# Patient Record
Sex: FEMALE | Race: WHITE | NOT HISPANIC OR LATINO | Employment: FULL TIME | URBAN - METROPOLITAN AREA
[De-identification: names, ages, dates, MRNs, and addresses within clinical notes are randomized per-mention and may not be internally consistent; named-entity substitution may affect disease eponyms.]

---

## 2017-01-04 ENCOUNTER — OFFICE VISIT (OUTPATIENT)
Dept: FAMILY MEDICINE | Facility: CLINIC | Age: 37
End: 2017-01-04
Payer: COMMERCIAL

## 2017-01-04 VITALS
TEMPERATURE: 96.7 F | DIASTOLIC BLOOD PRESSURE: 83 MMHG | SYSTOLIC BLOOD PRESSURE: 131 MMHG | WEIGHT: 222 LBS | BODY MASS INDEX: 37.21 KG/M2 | OXYGEN SATURATION: 100 % | HEART RATE: 90 BPM

## 2017-01-04 DIAGNOSIS — J01.90 ACUTE SINUSITIS WITH SYMPTOMS > 10 DAYS: Primary | ICD-10-CM

## 2017-01-04 DIAGNOSIS — Z30.42 DEPO-PROVERA CONTRACEPTIVE STATUS: ICD-10-CM

## 2017-01-04 DIAGNOSIS — Z23 NEED FOR PROPHYLACTIC VACCINATION AND INOCULATION AGAINST INFLUENZA: ICD-10-CM

## 2017-01-04 PROCEDURE — 99213 OFFICE O/P EST LOW 20 MIN: CPT | Mod: 25 | Performed by: PHYSICIAN ASSISTANT

## 2017-01-04 PROCEDURE — 90686 IIV4 VACC NO PRSV 0.5 ML IM: CPT | Performed by: PHYSICIAN ASSISTANT

## 2017-01-04 PROCEDURE — 96372 THER/PROPH/DIAG INJ SC/IM: CPT | Mod: 59 | Performed by: PHYSICIAN ASSISTANT

## 2017-01-04 PROCEDURE — 90471 IMMUNIZATION ADMIN: CPT | Performed by: PHYSICIAN ASSISTANT

## 2017-01-04 RX ORDER — DOXYCYCLINE HYCLATE 100 MG
100 TABLET ORAL 2 TIMES DAILY
Qty: 20 TABLET | Refills: 0 | Status: SHIPPED | OUTPATIENT
Start: 2017-01-04 | End: 2017-07-14

## 2017-01-04 NOTE — NURSING NOTE
BLOOD PRESSURE: 131/83    DATE OF LAST PAP or ANNUAL EXAM: PAP      NIL   10/4/2012  URINE HCG:not indicated    The following medication was given:     MEDICATION: Depo Provera 150mg  ROUTE: IM  SITE: Arm - Right  : Prevacus  LOT #: [id number]  EXPIRATION:  NEXT INJECTION DUE: March 22 - April 5 (Reminder card given to patient)  Provider: Kristen Kehr, PA-C Katie Cummings MA

## 2017-01-04 NOTE — PROGRESS NOTES
Injectable Influenza Immunization Documentation    1.  Is the person to be vaccinated sick today? Yes    2. Does the person to be vaccinated have an allergy to eggs or to a component of the vaccine?  No    3. Has the person to be vaccinated today ever had a serious reaction to influenza vaccine in the past?  No    4. Has the person to be vaccinated ever had Guillain-Hartley syndrome?  No     Form completed by Tamela Nunez MA

## 2017-01-04 NOTE — MR AVS SNAPSHOT
After Visit Summary   1/4/2017    Francesca Moura    MRN: [id number]           Patient Information     Date Of Birth          1980        Visit Information        Provider Department      1/4/2017 2:20 PM Marisa Mccormick PA-C [facility]        Today's Diagnoses     Acute sinusitis with symptoms > 10 days    -  1       Care Instructions    Rest and increase fluids.    Sleep with head of bed elevated at night. Have a humidifier running at night    Alternate motrin and tylenol as needed for discomfort.    Perform nasal saline rinses to help decrease nasal congestion or breath in steam multiple times daily.     Continue your allergy medication and flonase as directed.    Follow up with primary care provider in 1-2 weeks if no improvement of symptoms. Sooner if any worsening of symptoms.             Follow-ups after your visit        Who to contact     If you have questions or need follow up information about today's clinic visit or your schedule please contact [facility] directly at [phone].  Normal or non-critical lab and imaging results will be communicated to you by SKURAhart, letter or phone within 4 business days after the clinic has received the results. If you do not hear from us within 7 days, please contact the clinic through Formula XOt or phone. If you have a critical or abnormal lab result, we will notify you by phone as soon as possible.  Submit refill requests through Oz Sonotek or call your pharmacy and they will forward the refill request to us. Please allow 3 business days for your refill to be completed.          Additional Information About Your Visit        MyChart Information     [employer] Sonotek gives you secure access to your electronic health record. If you see a primary care provider, you can also send messages to your care team and make appointments. If you have questions, please call your primary care clinic.  If you do not have a primary care provider,  please call [phone] and they will assist you.        Care EveryWhere ID     This is your Care EveryWhere ID. This could be used by other organizations to access your [place] medical records  [id number]        Your Vitals Were     Pulse Temperature Pulse Oximetry             90 96.7  F (35.9  C) (Oral) 100%          Blood Pressure from Last 3 Encounters:   01/04/17 131/83   10/12/16 129/84   09/16/16 135/86    Weight from Last 3 Encounters:   01/04/17 222 lb (100.699 kg)   10/12/16 220 lb 3.2 oz (99.882 kg)   09/16/16 217 lb (98.431 kg)              Today, you had the following     No orders found for display         Today's Medication Changes          These changes are accurate as of: 1/4/17  3:00 PM.  If you have any questions, ask your nurse or doctor.               Start taking these medicines.        Dose/Directions    doxycycline 100 MG tablet   Commonly known as:  VIBRA-TABS   Used for:  Acute sinusitis with symptoms > 10 days   Started by:  Marisa Mccormick PA-C        Dose:  100 mg   Take 1 tablet (100 mg) by mouth 2 times daily   Quantity:  20 tablet   Refills:  0            Where to get your medicines      These medications were sent to [facility]/pharmacy #[id number] - [place] - [address] [address]     Phone:  [phone]    - doxycycline 100 MG tablet             Primary Care Provider Office Phone # Fax #    Kristen M Kehr, PA-C [phone]       [facility] [address] [place] [address] [address]        Thank you!     Thank you for choosing [facility]  for your care. Our goal is always to provide you with excellent care. Hearing back from our patients is one way we can continue to improve our services. Please take a few minutes to complete the written survey that you may receive in the mail after your visit with us. Thank you!             Your Updated Medication List - Protect others around you: Learn how to safely use,  store and throw away your medicines at www.disposemymeds.org.          This list is accurate as of: 1/4/17  3:00 PM.  Always use your most recent med list.                   Brand Name Dispense Instructions for use    albuterol 108 (90 BASE) MCG/ACT Inhaler    PROAIR HFA/PROVENTIL HFA/VENTOLIN HFA    2 Inhaler    Inhale 2 puffs into the lungs every 4 hours as needed for shortness of breath / dyspnea       ALLEGRA ALLERGY PO          amitriptyline 25 MG tablet    ELAVIL         amphetamine-dextroamphetamine 5 MG per tablet    ADDERALL     Take 5 mg by mouth 2 times daily. Take 1 to 2 tablets every 4-5 hours, up to 3 doses per day       doxycycline 100 MG tablet    VIBRA-TABS    20 tablet    Take 1 tablet (100 mg) by mouth 2 times daily       medroxyPROGESTERone 150 MG/ML injection    DEPO-PROVERA    1 mL    Inject 1 mL (150 mg) into the muscle every 3 months       traMADol-acetaminophen 37.5-325 MG per tablet    ULTRACET     Take 1 tablet by mouth every 8 hours as needed for moderate pain

## 2017-01-04 NOTE — PROGRESS NOTES
SUBJECTIVE:                                                    Francesca Moura is a 36 year old female who presents to clinic today for the following health issues:      ENT Symptoms             Symptoms: cc Present Absent Comment   Fever/Chills   x    Fatigue   x    Muscle Aches   x    Eye Irritation   x    Sneezing   x    Nasal Jordon/Drg  x  Congestion is worsening   Sinus Pressure/Pain  x  Right side is worse, maxillary and frontal sinus pressure causing a headache now   Loss of smell  x     Dental pain   x    Sore Throat   x    Swollen Glands   x    Ear Pain/Fullness  x  Right ear pain, denies drainage and hearing loss   Cough   x    Wheeze   x    Chest Pain   x    Shortness of breath   x    Rash   x    Other   x      Symptom duration:  4 1/2 weeks.    Symptom severity:  Moderate   Treatments tried:  OTC medications/treatments.    Contacts:  Work             Problem list and histories reviewed & adjusted, as indicated.  Additional history: none    Patient Active Problem List   Diagnosis     Panic attacks     Depression, major     CARDIOVASCULAR SCREENING; LDL GOAL LESS THAN 160     Neck pain     Headache     Chest wall pain     Vitamin D deficiency     Sinusitis, chronic     Fatigue     Right tennis elbow     Gastroesophageal reflux disease without esophagitis     Tachycardia     Intermittent asthma, uncomplicated     Idiopathic hypersomnolence     Past Surgical History   Procedure Laterality Date     Surgical history of -   2009     c section     Abdomen surgery  09            Social History   Substance Use Topics     Smoking status: Former Smoker     Types: Cigarettes     Quit date: 2009     Smokeless tobacco: Never Used     Alcohol Use: No     Family History   Problem Relation Age of Onset     Hypertension Mother      Asthma Father      Allergies Father      Respiratory Maternal Grandmother      HEART DISEASE Maternal Grandfather      HEART DISEASE Paternal Grandmother      Hyperlipidemia  Father      CEREBROVASCULAR DISEASE Paternal Grandfather      CEREBROVASCULAR DISEASE Maternal Grandfather      Breast Cancer Other      Colon Cancer Paternal Grandmother      Other Cancer Other      Lung     Other Cancer Other      Lung     Other Cancer Maternal Grandfather      Skin     Depression Mother      Hyperlipidemia Mother      Substance Abuse Father      Alcohol         Current Outpatient Prescriptions   Medication Sig Dispense Refill     traMADol-acetaminophen (ULTRACET) 37.5-325 MG per tablet Take 1 tablet by mouth every 8 hours as needed for moderate pain       medroxyPROGESTERone (DEPO-PROVERA) 150 MG/ML injection Inject 1 mL (150 mg) into the muscle every 3 months 1 mL 4     albuterol (PROAIR HFA, PROVENTIL HFA, VENTOLIN HFA) 108 (90 BASE) MCG/ACT inhaler Inhale 2 puffs into the lungs every 4 hours as needed for shortness of breath / dyspnea 2 Inhaler 0     amitriptyline (ELAVIL) 25 MG tablet        Fexofenadine HCl (ALLEGRA ALLERGY PO)        amphetamine-dextroamphetamine (ADDERALL) 5 MG tablet Take 5 mg by mouth 2 times daily. Take 1 to 2 tablets every 4-5 hours, up to 3 doses per day       Allergies   Allergen Reactions     Bactrim Hives     Cephalexin Hives     Penicillins Hives     Vancomycin Hcl Hives     PERSIST EVEN DAY AFTER STOPPING MEDICATION.  leucopenia     BP Readings from Last 3 Encounters:   01/04/17 131/83   10/12/16 129/84   09/16/16 135/86    Wt Readings from Last 3 Encounters:   01/04/17 222 lb (100.699 kg)   10/12/16 220 lb 3.2 oz (99.882 kg)   09/16/16 217 lb (98.431 kg)                  Problem list, Medication list, Allergies, and Medical/Social/Surgical histories reviewed in [facility] and updated as appropriate.    ROS:  Constitutional, HEENT, cardiovascular, pulmonary, and integumentary systems are negative, except as otherwise noted.    OBJECTIVE:                                                    /83 mmHg  Pulse 90  Temp(Src) 96.7  F (35.9  C) (Oral)  Wt 222 lb  (100.699 kg)  SpO2 100%  Body mass index is 37.21 kg/(m^2).  GENERAL: healthy, alert and no distress  EYES: Eyes grossly normal to inspection  HENT: normal cephalic/atraumatic, ear canals and TM's normal, nose and mouth without ulcers or lesions, nasal mucosa edematous , rhinorrhea yellow and thick, oropharynx clear, oral mucous membranes moist and sinuses: maxillary, frontal tenderness on both sides  NECK: no adenopathy, no asymmetry, masses, or scars and thyroid normal to palpation  RESP: lungs clear to auscultation - no rales, rhonchi or wheezes  CV: regular rate and rhythm, normal S1 S2, no S3 or S4, no murmur, click or rub, no peripheral edema and peripheral pulses strong  MS: no gross musculoskeletal defects noted, no edema  SKIN: no suspicious lesions or rashes         ASSESSMENT/PLAN:                                                        ICD-10-CM    1. Acute sinusitis with symptoms > 10 days J01.90 doxycycline (VIBRA-TABS) 100 MG tablet   2. Need for prophylactic vaccination and inoculation against influenza Z23 FLU VAC, SPLIT VIRUS IM > 3 YO (QUADRIVALENT) [76449]     Vaccine Administration, Initial [00378]   3. Depo-Provera contraceptive status Z30.40 C Medroxyprogesterone inj/1mg       Patient Instructions   Rest and increase fluids.    Sleep with head of bed elevated at night. Have a humidifier running at night    Alternate motrin and tylenol as needed for discomfort.    Perform nasal saline rinses to help decrease nasal congestion or breath in steam multiple times daily.     Continue your allergy medication and flonase as directed.    Follow up with primary care provider in 1-2 weeks if no improvement of symptoms. Sooner if any worsening of symptoms.             Marisa Mccormick PA-C  [facility]

## 2017-01-04 NOTE — PATIENT INSTRUCTIONS
Rest and increase fluids.    Sleep with head of bed elevated at night. Have a humidifier running at night    Alternate motrin and tylenol as needed for discomfort.    Perform nasal saline rinses to help decrease nasal congestion or breath in steam multiple times daily.     Continue your allergy medication and flonase as directed.    Follow up with primary care provider in 1-2 weeks if no improvement of symptoms. Sooner if any worsening of symptoms.

## 2017-01-04 NOTE — NURSING NOTE
"Chief Complaint   Patient presents with     Sinus Problem       Mask not indicated for this appointment.     Initial /83 mmHg  Pulse 90  Temp(Src) 96.7  F (35.9  C) (Oral)  Wt 222 lb (100.699 kg)  SpO2 100% Estimated body mass index is 37.21 kg/(m^2) as calculated from the following:    Height as of 6/6/16: 5' 4.75\" (1.645 m).    Weight as of this encounter: 222 lb (100.699 kg)..  BP completed using cuff size: leilani Nunez MA    "

## 2017-01-24 DIAGNOSIS — J45.20 INTERMITTENT ASTHMA, UNCOMPLICATED: Primary | ICD-10-CM

## 2017-01-24 RX ORDER — ALBUTEROL SULFATE 90 UG/1
2 AEROSOL, METERED RESPIRATORY (INHALATION) EVERY 4 HOURS PRN
Qty: 2 INHALER | Refills: 0 | Status: SHIPPED | OUTPATIENT
Start: 2017-01-24 | End: 2017-03-22

## 2017-03-22 ENCOUNTER — OFFICE VISIT (OUTPATIENT)
Dept: FAMILY MEDICINE | Facility: CLINIC | Age: 37
End: 2017-03-22
Payer: COMMERCIAL

## 2017-03-22 VITALS
HEIGHT: 65 IN | TEMPERATURE: 98.6 F | DIASTOLIC BLOOD PRESSURE: 79 MMHG | SYSTOLIC BLOOD PRESSURE: 123 MMHG | HEART RATE: 87 BPM | BODY MASS INDEX: 37.65 KG/M2 | OXYGEN SATURATION: 99 % | WEIGHT: 226 LBS

## 2017-03-22 DIAGNOSIS — J45.20 INTERMITTENT ASTHMA, UNCOMPLICATED: ICD-10-CM

## 2017-03-22 DIAGNOSIS — M25.511 RIGHT SHOULDER PAIN, UNSPECIFIED CHRONICITY: Primary | ICD-10-CM

## 2017-03-22 PROCEDURE — 99213 OFFICE O/P EST LOW 20 MIN: CPT | Performed by: PHYSICIAN ASSISTANT

## 2017-03-22 RX ORDER — ALBUTEROL SULFATE 90 UG/1
2 AEROSOL, METERED RESPIRATORY (INHALATION) EVERY 4 HOURS PRN
Qty: 2 INHALER | Refills: 3 | Status: SHIPPED | OUTPATIENT
Start: 2017-03-22 | End: 2018-03-31

## 2017-03-22 NOTE — PATIENT INSTRUCTIONS
Contact Physical Therapy Scheduling at [phone]      Specialty Scheduling for Orthopedics: [phone]

## 2017-03-22 NOTE — MR AVS SNAPSHOT
After Visit Summary   3/22/2017    Francesca Moura    MRN: [id number]           Patient Information     Date Of Birth          1980        Visit Information        Provider Department      3/22/2017 12:50 PM Kehr, Kristen M, PA-C [facility]        Today's Diagnoses     Right shoulder pain, unspecified chronicity    -  1      Care Instructions    Contact Physical Therapy Scheduling at [phone]      Specialty Scheduling for Orthopedics: [phone]        Follow-ups after your visit        Additional Services     [facility] PT, HAND, AND CHIROPRACTIC REFERRAL       **This order will print in the [facility] Scheduling Office**    Physical Therapy, Hand Therapy and Chiropractic Care are available through:    *[place] [facility]  *[facility]  *[facility]    Call one number to schedule at any of the above locations: [phone].    Your provider has referred you to: Physical Therapy at [facility] or [facility]    Indication/Reason for Referral: right shoulder pain  Onset of Illness: 6-8 months  Therapy Orders: Evaluate and Treat  Special Programs: None  Special Request: None    Dasha Lou      Additional Comments for the Therapist or Chiropractor:       Please be aware that coverage of these services is subject to the terms and limitations of your health insurance plan.  Call member services at your health plan with any benefit or coverage questions.      Please bring the following to your appointment:    *Your personal calendar for scheduling future appointments  *Comfortable clothing            ORTHOPEDICS ADULT REFERRAL       Your provider has referred you to: [facility] Soniya Mariano [phone]   [email or web address]    Please be aware that coverage of these services is subject to the terms and limitations of your health insurance plan.  Call member services at your health plan with any benefit or  coverage questions.      Please bring the following to your appointment:    >>   Any x-rays, CTs or MRIs which have been performed.  Contact the facility where they were done to arrange for  prior to your scheduled appointment.    >>   List of current medications   >>   This referral request   >>   Any documents/labs given to you for this referral                  Who to contact     If you have questions or need follow up information about today's clinic visit or your schedule please contact [facility] directly at [phone].  Normal or non-critical lab and imaging results will be communicated to you by MyChart, letter or phone within 4 business days after the clinic has received the results. If you do not hear from us within 7 days, please contact the clinic through Univisionhart or phone. If you have a critical or abnormal lab result, we will notify you by phone as soon as possible.  Submit refill requests through Roadster or call your pharmacy and they will forward the refill request to us. Please allow 3 business days for your refill to be completed.          Additional Information About Your Visit        [employer] Information     Roadster gives you secure access to your electronic health record. If you see a primary care provider, you can also send messages to your care team and make appointments. If you have questions, please call your primary care clinic.  If you do not have a primary care provider, please call [phone] and they will assist you.        Care EveryWhere ID     This is your Care EveryWhere ID. This could be used by other organizations to access your [place] medical records  [id number]         Blood Pressure from Last 3 Encounters:   01/04/17 131/83   10/12/16 129/84   09/16/16 135/86    Weight from Last 3 Encounters:   01/04/17 222 lb (100.7 kg)   10/12/16 220 lb 3.2 oz (99.9 kg)   09/16/16 217 lb (98.4 kg)              We Performed the Following     [facility] PT, HAND, AND  CHIROPRACTIC REFERRAL     ORTHOPEDICS ADULT REFERRAL        Primary Care Provider Office Phone # Fax #    Kristen M Kehr, PA-C [phone]       [facility] [address] [address] [facility] [address]        Thank you!     Thank you for choosing [facility]  for your care. Our goal is always to provide you with excellent care. Hearing back from our patients is one way we can continue to improve our services. Please take a few minutes to complete the written survey that you may receive in the mail after your visit with us. Thank you!             Your Updated Medication List - Protect others around you: Learn how to safely use, store and throw away your medicines at www.disposemymeds.org.          This list is accurate as of: 3/22/17  1:10 PM.  Always use your most recent med list.                   Brand Name Dispense Instructions for use    albuterol 108 (90 BASE) MCG/ACT Inhaler    PROAIR HFA/PROVENTIL HFA/VENTOLIN HFA    2 Inhaler    Inhale 2 puffs into the lungs every 4 hours as needed for shortness of breath / dyspnea       ALLEGRA ALLERGY PO          amitriptyline 25 MG tablet    ELAVIL         amphetamine-dextroamphetamine 5 MG per tablet    ADDERALL     Take 5 mg by mouth 2 times daily. Take 1 to 2 tablets every 4-5 hours, up to 3 doses per day       doxycycline 100 MG tablet    VIBRA-TABS    20 tablet    Take 1 tablet (100 mg) by mouth 2 times daily       medroxyPROGESTERone 150 MG/ML injection    DEPO-PROVERA    1 mL    Inject 1 mL (150 mg) into the muscle every 3 months       traMADol-acetaminophen 37.5-325 MG per tablet    ULTRACET     Take 1 tablet by mouth every 8 hours as needed for moderate pain

## 2017-03-22 NOTE — PROGRESS NOTES
SUBJECTIVE:                                                    Francesca Moura is a 36 year old female who presents to clinic today for the following health issues:        Shoulder pain R side near arm pit. Dull throbbing sensation. Going on for the last 6-8 months. Pain has got worse over the last 2-3 weeks. Certain ROM makes it worse.   She works at [employer] and tried to lift a box of tile yesterday, that made the pain worse.   She denies an injury. She is already using diclofenac and ultram daily for fibromyalgia and they are not helping for her shoulder.         Problem list and histories reviewed & adjusted, as indicated.  Additional history: as documented    Patient Active Problem List   Diagnosis     Panic attacks     Depression, major     CARDIOVASCULAR SCREENING; LDL GOAL LESS THAN 160     Neck pain     Headache     Chest wall pain     Vitamin D deficiency     Sinusitis, chronic     Fatigue     Right tennis elbow     Gastroesophageal reflux disease without esophagitis     Tachycardia     Intermittent asthma, uncomplicated     Idiopathic hypersomnolence     Past Surgical History:   Procedure Laterality Date     ABDOMEN SURGERY  09         SURGICAL HISTORY OF -   2009    c section       Social History   Substance Use Topics     Smoking status: Former Smoker     Types: Cigarettes     Quit date: 3/8/2009     Smokeless tobacco: Never Used     Alcohol use No     Family History   Problem Relation Age of Onset     Hypertension Mother      Depression Mother      Hyperlipidemia Mother      Asthma Father      Allergies Father      Hyperlipidemia Father      Substance Abuse Father      Alcohol     Respiratory Maternal Grandmother      HEART DISEASE Maternal Grandfather      CEREBROVASCULAR DISEASE Maternal Grandfather      Other Cancer Maternal Grandfather      Skin     HEART DISEASE Paternal Grandmother      Colon Cancer Paternal Grandmother      CEREBROVASCULAR DISEASE Paternal Grandfather       "Breast Cancer Other      Other Cancer Other      Lung     Other Cancer Other      Lung         Current Outpatient Prescriptions   Medication Sig Dispense Refill     albuterol (PROAIR HFA/PROVENTIL HFA/VENTOLIN HFA) 108 (90 BASE) MCG/ACT Inhaler Inhale 2 puffs into the lungs every 4 hours as needed for shortness of breath / dyspnea 2 Inhaler 3     [DISCONTINUED] albuterol (PROAIR HFA/PROVENTIL HFA/VENTOLIN HFA) 108 (90 BASE) MCG/ACT Inhaler Inhale 2 puffs into the lungs every 4 hours as needed for shortness of breath / dyspnea 2 Inhaler 0     doxycycline (VIBRA-TABS) 100 MG tablet Take 1 tablet (100 mg) by mouth 2 times daily 20 tablet 0     traMADol-acetaminophen (ULTRACET) 37.5-325 MG per tablet Take 1 tablet by mouth every 8 hours as needed for moderate pain       medroxyPROGESTERone (DEPO-PROVERA) 150 MG/ML injection Inject 1 mL (150 mg) into the muscle every 3 months 1 mL 4     amitriptyline (ELAVIL) 25 MG tablet        Fexofenadine HCl (ALLEGRA ALLERGY PO)        amphetamine-dextroamphetamine (ADDERALL) 5 MG tablet Take 5 mg by mouth 2 times daily. Take 1 to 2 tablets every 4-5 hours, up to 3 doses per day       Allergies   Allergen Reactions     Bactrim Hives     Cephalexin Hives     Penicillins Hives     Vancomycin Hcl Hives     PERSIST EVEN DAY AFTER STOPPING MEDICATION.  leucopenia       Reviewed and updated as needed this visit by clinical staff       Reviewed and updated as needed this visit by Provider         ROS:  Constitutional, HEENT, cardiovascular, pulmonary, gi and gu systems are negative, except as otherwise noted.    OBJECTIVE:                                                    /79  Pulse 87  Temp 98.6  F (37  C) (Oral)  Ht 5' 5\" (1.651 m)  Wt 226 lb (102.5 kg)  SpO2 99%  BMI 37.61 kg/m2  Body mass index is 37.61 kg/(m^2).  GENERAL: healthy, alert and no distress  MS: R. Shoulder exam shows normal strength and muscle mass, no deformities, no evidence of joint instability. There is " tenderness over the biceps tendon. She has normal ROM of the shoulder joint.   SKIN: no suspicious lesions or rashes  NEURO: Normal strength and tone, mentation intact and speech normal  PSYCH: mentation appears normal, affect normal/bright    Diagnostic Test Results:  none      ASSESSMENT/PLAN:                                                      1. Right shoulder pain, unspecified chronicity  Start with physical therapy.   Defers MRI at this time due to cost.   Plan to see Orthopedics if pain worsens or persists.   - ORTHOPEDICS ADULT REFERRAL  - [facility] PT, HAND, AND CHIROPRACTIC REFERRAL    2. Intermittent asthma, uncomplicated  Stable refills given  - albuterol (PROAIR HFA/PROVENTIL HFA/VENTOLIN HFA) 108 (90 BASE) MCG/ACT Inhaler; Inhale 2 puffs into the lungs every 4 hours as needed for shortness of breath / dyspnea  Dispense: 2 Inhaler; Refill: 3      Kristen M. Kehr, PA-C  [facility]

## 2017-03-22 NOTE — NURSING NOTE
"Chief Complaint   Patient presents with     Shoulder Pain       Initial /79  Pulse 87  Temp 98.6  F (37  C) (Oral)  Ht 5' 5\" (1.651 m)  Wt 226 lb (102.5 kg)  SpO2 99%  BMI 37.61 kg/m2 Estimated body mass index is 37.61 kg/(m^2) as calculated from the following:    Height as of this encounter: 5' 5\" (1.651 m).    Weight as of this encounter: 226 lb (102.5 kg).  Medication Reconciliation: complete    Christine Amaya MA    "

## 2017-03-23 ASSESSMENT — ASTHMA QUESTIONNAIRES: ACT_TOTALSCORE: 23

## 2017-04-05 ENCOUNTER — ALLIED HEALTH/NURSE VISIT (OUTPATIENT)
Dept: NURSING | Facility: CLINIC | Age: 37
End: 2017-04-05
Payer: COMMERCIAL

## 2017-04-05 VITALS
BODY MASS INDEX: 38.11 KG/M2 | WEIGHT: 229 LBS | SYSTOLIC BLOOD PRESSURE: 133 MMHG | DIASTOLIC BLOOD PRESSURE: 87 MMHG | HEART RATE: 87 BPM

## 2017-04-05 DIAGNOSIS — Z30.42 DEPO-PROVERA CONTRACEPTIVE STATUS: Primary | ICD-10-CM

## 2017-04-05 PROCEDURE — 96372 THER/PROPH/DIAG INJ SC/IM: CPT

## 2017-04-05 PROCEDURE — 99207 ZZC NO CHARGE NURSE ONLY: CPT

## 2017-04-05 NOTE — NURSING NOTE
BLOOD PRESSURE: 133/87    The following medication was given:     MEDICATION: Depo Provera 150mg  ROUTE: IM  SITE: Deltoid - Left  : [employer]  LOT #: [id number]  EXPIRATION:8/2019  NEXT INJECTION DUE: June 20-July 4 2017  Provider: KRISTEN KEHR Elizabeth French, MA

## 2017-04-05 NOTE — MR AVS SNAPSHOT
After Visit Summary   4/5/2017    Francesca Moura    MRN: [id number]           Patient Information     Date Of Birth          1980        Visit Information        Provider Department      4/5/2017 1:45 PM AN ANCILLARY [facility]        Today's Diagnoses     Depo-Provera contraceptive status    -  1       Follow-ups after your visit        Who to contact     If you have questions or need follow up information about today's clinic visit or your schedule please contact [facility] directly at [phone].  Normal or non-critical lab and imaging results will be communicated to you by The Ath[employer] Empire[employer], letter or phone within 4 business days after the clinic has received the results. If you do not hear from us within 7 days, please contact the clinic through Code Fevert or phone. If you have a critical or abnormal lab result, we will notify you by phone as soon as possible.  Submit refill requests through [employer] or call your pharmacy and they will forward the refill request to us. Please allow 3 business days for your refill to be completed.          Additional Information About Your Visit        MyChart Information     [employer] gives you secure access to your electronic health record. If you see a primary care provider, you can also send messages to your care team and make appointments. If you have questions, please call your primary care clinic.  If you do not have a primary care provider, please call [phone] and they will assist you.        Care EveryWhere ID     This is your Care EveryWhere ID. This could be used by other organizations to access your [place] medical records  [id number]        Your Vitals Were     Pulse BMI (Body Mass Index)                87 38.11 kg/m2           Blood Pressure from Last 3 Encounters:   04/05/17 133/87   03/22/17 123/79   01/04/17 131/83    Weight from Last 3 Encounters:   04/05/17 229 lb (103.9 kg)   03/22/17 226 lb (102.5 kg)   01/04/17 222 lb  (100.7 kg)              We Performed the Following     C Medroxyprogesterone inj/1mg        Primary Care Provider Office Phone # Fax #    Kristen M Kehr, PA-C [phone]       [facility] [address] [facility] [address]        Thank you!     Thank you for choosing [facility]  for your care. Our goal is always to provide you with excellent care. Hearing back from our patients is one way we can continue to improve our services. Please take a few minutes to complete the written survey that you may receive in the mail after your visit with us. Thank you!             Your Updated Medication List - Protect others around you: Learn how to safely use, store and throw away your medicines at www.disposemymeds.org.          This list is accurate as of: 4/5/17  1:58 PM.  Always use your most recent med list.                   Brand Name Dispense Instructions for use    albuterol 108 (90 BASE) MCG/ACT Inhaler    PROAIR HFA/PROVENTIL HFA/VENTOLIN HFA    2 Inhaler    Inhale 2 puffs into the lungs every 4 hours as needed for shortness of breath / dyspnea       ALLEGRA ALLERGY PO          amitriptyline 25 MG tablet    ELAVIL         amphetamine-dextroamphetamine 5 MG per tablet    ADDERALL     Take 5 mg by mouth 2 times daily. Take 1 to 2 tablets every 4-5 hours, up to 3 doses per day       doxycycline 100 MG tablet    VIBRA-TABS    20 tablet    Take 1 tablet (100 mg) by mouth 2 times daily       medroxyPROGESTERone 150 MG/ML injection    DEPO-PROVERA    1 mL    Inject 1 mL (150 mg) into the muscle every 3 months       traMADol-acetaminophen 37.5-325 MG per tablet    ULTRACET     Take 1 tablet by mouth every 8 hours as needed for moderate pain

## 2017-04-11 ENCOUNTER — TRANSFERRED RECORDS (OUTPATIENT)
Dept: HEALTH INFORMATION MANAGEMENT | Facility: CLINIC | Age: 37
End: 2017-04-11

## 2017-07-03 ENCOUNTER — ALLIED HEALTH/NURSE VISIT (OUTPATIENT)
Dept: NURSING | Facility: CLINIC | Age: 37
End: 2017-07-03
Payer: COMMERCIAL

## 2017-07-03 DIAGNOSIS — Z30.42 SURVEILLANCE FOR DEPO-PROVERA CONTRACEPTION: Primary | ICD-10-CM

## 2017-07-03 PROCEDURE — 99207 ZZC NO CHARGE NURSE ONLY: CPT

## 2017-07-03 PROCEDURE — 96372 THER/PROPH/DIAG INJ SC/IM: CPT

## 2017-07-03 NOTE — PROGRESS NOTES
BLOOD PRESSURE: Data Unavailable    DATE OF LAST PAP or ANNUAL EXAM: Lab Results       Component                Value               Date                       PAP                      NIL                 10/04/2012            URINE HCG:not indicated    The following medication was given:     MEDICATION: Depo Provera 150mg  ROUTE: IM  SITE: Deltoid - Right  : [employer]  LOT #: [id number]  EXPIRATION:12/2019  NEXT INJECTION DUE: September 18-October 2,2017  NDC [address]-8  Provider: Kristen Kehr CColburn, CMA

## 2017-07-03 NOTE — MR AVS SNAPSHOT
After Visit Summary   7/3/2017    Francesca Moura    MRN: [id number]           Patient Information     Date Of Birth          1980        Visit Information        Provider Department      7/3/2017 12:15 PM BE ANCILLARY [facility]        Today's Diagnoses     Surveillance for Depo-Provera contraception    -  1       Follow-ups after your visit        Who to contact     If you have questions or need follow up information about today's clinic visit or your schedule please contact [facility] directly at [phone].  Normal or non-critical lab and imaging results will be communicated to you by MyChart, letter or phone within 4 business days after the clinic has received the results. If you do not hear from us within 7 days, please contact the clinic through Happy [employer] or phone. If you have a critical or abnormal lab result, we will notify you by phone as soon as possible.  Submit refill requests through [employer] or call your pharmacy and they will forward the refill request to us. Please allow 3 business days for your refill to be completed.          Additional Information About Your Visit        MyChart Information     [employer] gives you secure access to your electronic health record. If you see a primary care provider, you can also send messages to your care team and make appointments. If you have questions, please call your primary care clinic.  If you do not have a primary care provider, please call [phone] and they will assist you.        Care EveryWhere ID     This is your Care EveryWhere ID. This could be used by other organizations to access your [place] medical records  [id number]         Blood Pressure from Last 3 Encounters:   04/05/17 133/87   03/22/17 123/79   01/04/17 131/83    Weight from Last 3 Encounters:   04/05/17 229 lb (103.9 kg)   03/22/17 226 lb (102.5 kg)   01/04/17 222 lb (100.7 kg)              We Performed the Following     INJECTION INTRAMUSCULAR  OR SUB-Q     Medroxyprogesterone inj/1mg (Depo Provera J-Code)        Primary Care Provider Office Phone # Fax #    Kristen M Kehr, PA-C [phone]       [facility] [address] [facility] [address]        Equal Access to Services     TOMASZ RAMOS : Hadii aad ku hadasho Soomaali, waaxda luqadaha, qaybta kaalmada adeegyada, waxay idiin hayaan adeeg kharash la'aan . So [facility] [place] [facility] [phone].    ATENCIÓN: Si habla español, tiene a garcia disposición servicios gratuitos de asistencia lingüística. Angeloame al [phone].    We comply with applicable federal civil rights laws and [place] laws. We do not discriminate on the basis of race, color, national origin, age, disability sex, sexual orientation or gender identity.            Thank you!     Thank you for choosing [facility]  for your care. Our goal is always to provide you with excellent care. Hearing back from our patients is one way we can continue to improve our services. Please take a few minutes to complete the written survey that you may receive in the mail after your visit with us. Thank you!             Your Updated Medication List - Protect others around you: Learn how to safely use, store and throw away your medicines at www.disposemymeds.org.          This list is accurate as of: 7/3/17 12:51 PM.  Always use your most recent med list.                   Brand Name Dispense Instructions for use Diagnosis    albuterol 108 (90 BASE) MCG/ACT Inhaler    PROAIR HFA/PROVENTIL HFA/VENTOLIN HFA    2 Inhaler    Inhale 2 puffs into the lungs every 4 hours as needed for shortness of breath / dyspnea    Intermittent asthma, uncomplicated       ALLEGRA ALLERGY PO           amitriptyline 25 MG tablet    ELAVIL          amphetamine-dextroamphetamine 5 MG per tablet    ADDERALL     Take 5 mg by mouth 2 times daily. Take 1 to 2 tablets every 4-5 hours, up to 3 doses per day        doxycycline 100 MG tablet    VIBRA-TABS    20 tablet     Take 1 tablet (100 mg) by mouth 2 times daily    Acute sinusitis with symptoms > 10 days       medroxyPROGESTERone 150 MG/ML injection    DEPO-PROVERA    1 mL    Inject 1 mL (150 mg) into the muscle every 3 months    Encounter for surveillance of contraceptives       traMADol-acetaminophen 37.5-325 MG per tablet    ULTRACET     Take 1 tablet by mouth every 8 hours as needed for moderate pain

## 2017-07-14 ENCOUNTER — OFFICE VISIT (OUTPATIENT)
Dept: FAMILY MEDICINE | Facility: CLINIC | Age: 37
End: 2017-07-14
Payer: COMMERCIAL

## 2017-07-14 VITALS
TEMPERATURE: 98.2 F | DIASTOLIC BLOOD PRESSURE: 88 MMHG | OXYGEN SATURATION: 98 % | WEIGHT: 226 LBS | SYSTOLIC BLOOD PRESSURE: 134 MMHG | HEART RATE: 97 BPM | HEIGHT: 65 IN | BODY MASS INDEX: 37.65 KG/M2

## 2017-07-14 DIAGNOSIS — M25.571 RIGHT ANKLE PAIN, UNSPECIFIED CHRONICITY: Primary | ICD-10-CM

## 2017-07-14 DIAGNOSIS — M79.671 RIGHT FOOT PAIN: ICD-10-CM

## 2017-07-14 PROCEDURE — 99214 OFFICE O/P EST MOD 30 MIN: CPT | Performed by: PHYSICIAN ASSISTANT

## 2017-07-14 RX ORDER — TRAMADOL HYDROCHLORIDE 50 MG/1
50 TABLET ORAL EVERY 6 HOURS PRN
Qty: 60 TABLET | Refills: 0 | COMMUNITY
Start: 2017-07-14

## 2017-07-14 RX ORDER — DEXTROAMPHETAMINE SACCHARATE, AMPHETAMINE ASPARTATE, DEXTROAMPHETAMINE SULFATE AND AMPHETAMINE SULFATE 2.5; 2.5; 2.5; 2.5 MG/1; MG/1; MG/1; MG/1
10 TABLET ORAL 2 TIMES DAILY
Qty: 75 TABLET | Refills: 0 | COMMUNITY
Start: 2017-07-14

## 2017-07-14 ASSESSMENT — ANXIETY QUESTIONNAIRES
1. FEELING NERVOUS, ANXIOUS, OR ON EDGE: NOT AT ALL
GAD7 TOTAL SCORE: 1
7. FEELING AFRAID AS IF SOMETHING AWFUL MIGHT HAPPEN: NOT AT ALL
2. NOT BEING ABLE TO STOP OR CONTROL WORRYING: NOT AT ALL
IF YOU CHECKED OFF ANY PROBLEMS ON THIS QUESTIONNAIRE, HOW DIFFICULT HAVE THESE PROBLEMS MADE IT FOR YOU TO DO YOUR WORK, TAKE CARE OF THINGS AT HOME, OR GET ALONG WITH OTHER PEOPLE: NOT DIFFICULT AT ALL
6. BECOMING EASILY ANNOYED OR IRRITABLE: NOT AT ALL
3. WORRYING TOO MUCH ABOUT DIFFERENT THINGS: NOT AT ALL
5. BEING SO RESTLESS THAT IT IS HARD TO SIT STILL: NOT AT ALL

## 2017-07-14 ASSESSMENT — PATIENT HEALTH QUESTIONNAIRE - PHQ9: 5. POOR APPETITE OR OVEREATING: SEVERAL DAYS

## 2017-07-14 NOTE — PROGRESS NOTES
SUBJECTIVE:                                                    Francesca Moura is a 36 year old female who presents to clinic today for the following health issues:    Joint Pain    Onset: x 1 month now worse but has been ongoing for years    Description:   Location: R foot  Character: Sharp, Dull ache and Stabbing    Intensity: mild    Progression of Symptoms: same    Accompanying Signs & Symptoms:  Other symptoms: none    History:   Previous similar pain: YES      Precipitating factors:   Trauma or overuse: no     Alleviating factors:  Improved by: rest/inactivity, heat, ice and immobilization    Therapies Tried and outcome: Noted above      Patient with fibromyalgia who presents with  right ankle and foot pain that she has had for years. Lateral ankle hurts worse but also has medial pain off and on too with her ankle.    Also has on/off plantar fascitis but she is not here for that today.     Has sprained her ankle in the past but No known re-injury.  Swells off and on. Seems to be getting worse again.  Has seen podiatry in the past.   She previously wore a cam walking boot x 5 weeks, this was helpful but just comes back after she stops wearing it.  Did go to physical therapy once and was given exercises, they did not help that much per patient. Recently she has Been trying to tape her foot which helps but then stopped helping.      No other imaging other than xrays done of her ankle, had MRI w/o contrast of her foot that was negative except for mild arthritis.    Was told by Dr. Michael to consider MRI if not improving of ankle per patient.   Been trying to tape her foot which helps but then stopped helping.    Takes tramadol 50 mg BID already through Dr. Jose Guadalupe Snow regularly for fibromyalgia.    Ibuprofen does not help foot much.     BMI is 37, losing weight would help her joint/muscle complaints overall.               Problem list and histories reviewed & adjusted, as indicated.  Additional history:  as documented    Patient Active Problem List   Diagnosis     Panic attacks     Depression, major     CARDIOVASCULAR SCREENING; LDL GOAL LESS THAN 160     Neck pain     Headache     Chest wall pain     Vitamin D deficiency     Sinusitis, chronic     Fatigue     Right tennis elbow     Gastroesophageal reflux disease without esophagitis     Tachycardia     Intermittent asthma, uncomplicated     Idiopathic hypersomnolence     Past Surgical History:   Procedure Laterality Date     ABDOMEN SURGERY  09         SURGICAL HISTORY OF -   2009    c section       Social History   Substance Use Topics     Smoking status: Former Smoker     Types: Cigarettes     Quit date: 3/8/2009     Smokeless tobacco: Never Used     Alcohol use No     Family History   Problem Relation Age of Onset     Hypertension Mother      Depression Mother      Hyperlipidemia Mother      Asthma Father      Allergies Father      Hyperlipidemia Father      Substance Abuse Father      Alcohol     Respiratory Maternal Grandmother      HEART DISEASE Maternal Grandfather      CEREBROVASCULAR DISEASE Maternal Grandfather      Other Cancer Maternal Grandfather      Skin     HEART DISEASE Paternal Grandmother      Colon Cancer Paternal Grandmother      CEREBROVASCULAR DISEASE Paternal Grandfather      Breast Cancer Other      Other Cancer Other      Lung     Other Cancer Other      Lung         Current Outpatient Prescriptions   Medication Sig Dispense Refill     amphetamine-dextroamphetamine (ADDERALL) 10 MG per tablet Take 1 tablet (10 mg) by mouth 2 times daily Take a third tablet if needed 75 tablet 0     traMADol (ULTRAM) 50 MG tablet Take 1 tablet (50 mg) by mouth every 6 hours as needed for moderate pain 60 tablet 0     albuterol (PROAIR HFA/PROVENTIL HFA/VENTOLIN HFA) 108 (90 BASE) MCG/ACT Inhaler Inhale 2 puffs into the lungs every 4 hours as needed for shortness of breath / dyspnea 2 Inhaler 3     amitriptyline (ELAVIL) 25 MG tablet         "Fexofenadine HCl (ALLEGRA ALLERGY PO)        medroxyPROGESTERone (DEPO-PROVERA) 150 MG/ML injection Inject 1 mL (150 mg) into the muscle every 3 months 1 mL 4     Allergies   Allergen Reactions     Bactrim Hives     Cephalexin Hives     Penicillins Hives     Vancomycin Hcl Hives     PERSIST EVEN DAY AFTER STOPPING MEDICATION.  leucopenia       ROS:  Constitutional, HEENT, cardiovascular, pulmonary, gi and gu systems are negative, except as otherwise noted.    OBJECTIVE:     /88  Pulse 97  Temp 98.2  F (36.8  C) (Oral)  Ht 5' 5\" (1.651 m)  Wt 226 lb (102.5 kg)  SpO2 98%  Breastfeeding? No  BMI 37.61 kg/m2  Body mass index is 37.61 kg/(m^2).  GENERAL: alert and no distress  RESP: lungs clear to auscultation - no rales, rhonchi or wheezes  CV: regular rate and rhythm, normal S1 S2, no S3 or S4, no murmur, click or rub, no peripheral edema and peripheral pulses strong  SKIN: no suspicious lesions or rashes  PSYCH: mentation appears normal, affect normal/bright  Ortho: right ankle-No gross deformity.  No erythema.  Mild nonpitting Edema medial malleolar region.  No ecchymosis. No warmth.  Tender to palpation medial and lateral malleolar regions, also dorsally along peroneal tendon region .   Range of motion intact fully.  Sensation intact distally.  Distal pulses strong.  Knee and ankle strength 5/5 and equal bilaterally.  Anterior drawer sign negative.      Last ankle xray as below:    Study Result   ANKLE RIGHT THREE OR MORE VIEWS September 16, 2016 9:41 AM      HISTORY: Pain in right ankle and joints of right foot.     COMPARISON: 11/4/2013.         IMPRESSION: No evidence of fracture. The ankle mortise is congruent.  There is a plantar calcaneal spur.     DENILSON WHALEY MD           ASSESSMENT/PLAN:     ASSESSMENT / PLAN:  (M25.577) Right ankle pain, unspecified chronicity  (primary encounter diagnosis)  Comment: message is out to Dr. Michael to see if I should order MRI of ankle or not before patient " sees him in 5 days.  We scheduled this f/u today.  She will start wearing her cam walker again to help.   Plan: PODIATRY/FOOT & ANKLE SURGERY REFERRAL            (M79.287) Right foot pain  Comment:   Plan: PODIATRY/FOOT & ANKLE SURGERY REFERRAL                Danita Thao PA-C  [facility]

## 2017-07-14 NOTE — MR AVS SNAPSHOT
After Visit Summary   7/14/2017    Francesca Moura    MRN: [id number]           Patient Information     Date Of Birth          1980        Visit Information        Provider Department      7/14/2017 8:00 AM Danita Thao PA-C [facility]        Today's Diagnoses     Right ankle pain, unspecified chronicity    -  1    Right foot pain           Follow-ups after your visit        Additional Services     PODIATRY/FOOT & ANKLE SURGERY REFERRAL       Your provider has referred you to: [facility]: [facility] [phone]   [email or web address]    Please be aware that coverage of these services is subject to the terms and limitations of your health insurance plan.  Call member services at your health plan with any benefit or coverage questions.      Please bring the following to your appointment:  >>   Any x-rays, CTs or MRIs which have been performed.  Contact the facility where they were done to arrange for  prior to your scheduled appointment.    >>   List of current medications   >>   This referral request   >>   Any documents/labs given to you for this referral                  Who to contact     If you have questions or need follow up information about today's clinic visit or your schedule please contact [facility] directly at [phone].  Normal or non-critical lab and imaging results will be communicated to you by MyChart, letter or phone within 4 business days after the clinic has received the results. If you do not hear from us within 7 days, please contact the clinic through MyChart or phone. If you have a critical or abnormal lab result, we will notify you by phone as soon as possible.  Submit refill requests through [employer] or call your pharmacy and they will forward the refill request to us. Please allow 3 business days for your refill to be completed.          Additional Information About Your Visit       "  Podaddieshart Information     [employer] gives you secure access to your electronic health record. If you see a primary care provider, you can also send messages to your care team and make appointments. If you have questions, please call your primary care clinic.  If you do not have a primary care provider, please call [phone] and they will assist you.        Care EveryWhere ID     This is your Care EveryWhere ID. This could be used by other organizations to access your [place] medical records  [id number]        Your Vitals Were     Pulse Temperature Height Pulse Oximetry Breastfeeding? BMI (Body Mass Index)    97 98.2  F (36.8  C) (Oral) 5' 5\" (1.651 m) 98% No 37.61 kg/m2       Blood Pressure from Last 3 Encounters:   07/14/17 134/88   04/05/17 133/87   03/22/17 123/79    Weight from Last 3 Encounters:   07/14/17 226 lb (102.5 kg)   04/05/17 229 lb (103.9 kg)   03/22/17 226 lb (102.5 kg)              We Performed the Following     DEPRESSION ACTION PLAN (DAP)     PODIATRY/FOOT & ANKLE SURGERY REFERRAL          Today's Medication Changes          These changes are accurate as of: 7/14/17  8:31 AM.  If you have any questions, ask your nurse or doctor.               These medicines have changed or have updated prescriptions.        Dose/Directions    ADDERALL 10 MG per tablet   This may have changed:  Another medication with the same name was removed. Continue taking this medication, and follow the directions you see here.   Generic drug:  amphetamine-dextroamphetamine   Changed by:  Danita Thao PA-C        Dose:  10 mg   Take 1 tablet (10 mg) by mouth 2 times daily Take a third tablet if needed   Quantity:  75 tablet   Refills:  0         Stop taking these medicines if you haven't already. Please contact your care team if you have questions.     traMADol-acetaminophen 37.5-325 MG per tablet   Commonly known as:  ULTRACET   Stopped by:  Danita Thao PA-C                    Primary Care " Provider Office Phone # Fax #    Kristen M Kehr, PA-C [phone]       [facility] [address] [facility] [address]        Equal Access to Services     TOMASZ RAMOS : Hadii aad ku hadjoseo Soomaali, waaxda luqadaha, qaybta kaalmada adeegyada, waxjalen idiin charmainen dominique ulloa heaven holliday. So [facility] [phone].    ATENCIÓN: Si habla español, tiene a garcia disposición servicios gratuitos de asistencia lingüística. Llame al [phone].    We comply with applicable federal civil rights laws and [place] laws. We do not discriminate on the basis of race, color, national origin, age, disability sex, sexual orientation or gender identity.            Thank you!     Thank you for choosing [facility]  for your care. Our goal is always to provide you with excellent care. Hearing back from our patients is one way we can continue to improve our services. Please take a few minutes to complete the written survey that you may receive in the mail after your visit with us. Thank you!             Your Updated Medication List - Protect others around you: Learn how to safely use, store and throw away your medicines at www.disposemymeds.org.          This list is accurate as of: 7/14/17  8:31 AM.  Always use your most recent med list.                   Brand Name Dispense Instructions for use Diagnosis    ADDERALL 10 MG per tablet   Generic drug:  amphetamine-dextroamphetamine     75 tablet    Take 1 tablet (10 mg) by mouth 2 times daily Take a third tablet if needed        albuterol 108 (90 BASE) MCG/ACT Inhaler    PROAIR HFA/PROVENTIL HFA/VENTOLIN HFA    2 Inhaler    Inhale 2 puffs into the lungs every 4 hours as needed for shortness of breath / dyspnea    Intermittent asthma, uncomplicated       ALLEGRA ALLERGY PO           amitriptyline 25 MG tablet    ELAVIL          medroxyPROGESTERone 150 MG/ML injection    DEPO-PROVERA    1 mL    Inject 1 mL (150 mg) into the muscle every 3 months     Encounter for surveillance of contraceptives       traMADol 50 MG tablet    ULTRAM    60 tablet    Take 1 tablet (50 mg) by mouth every 6 hours as needed for moderate pain

## 2017-07-14 NOTE — NURSING NOTE
"Chief Complaint   Patient presents with     Musculoskeletal Problem     R foot pain per pt on and off for a few years now but worst the past month, no known injury       Initial /88  Pulse 97  Temp 98.2  F (36.8  C) (Oral)  Ht 5' 5\" (1.651 m)  Wt 226 lb (102.5 kg)  SpO2 98%  Breastfeeding? No  BMI 37.61 kg/m2 Estimated body mass index is 37.61 kg/(m^2) as calculated from the following:    Height as of this encounter: 5' 5\" (1.651 m).    Weight as of this encounter: 226 lb (102.5 kg).  Medication Reconciliation: complete      Fabian Peralta MA    "

## 2017-07-15 ASSESSMENT — ANXIETY QUESTIONNAIRES: GAD7 TOTAL SCORE: 1

## 2017-07-15 ASSESSMENT — PATIENT HEALTH QUESTIONNAIRE - PHQ9: SUM OF ALL RESPONSES TO PHQ QUESTIONS 1-9: 3

## 2017-07-19 ENCOUNTER — OFFICE VISIT (OUTPATIENT)
Dept: PODIATRY | Facility: CLINIC | Age: 37
End: 2017-07-19
Payer: COMMERCIAL

## 2017-07-19 VITALS — HEART RATE: 103 BPM | WEIGHT: 226 LBS | OXYGEN SATURATION: 97 % | BODY MASS INDEX: 37.61 KG/M2

## 2017-07-19 DIAGNOSIS — M79.671 RIGHT FOOT PAIN: ICD-10-CM

## 2017-07-19 DIAGNOSIS — Q66.70 CAVUS DEFORMITY OF FOOT: Primary | ICD-10-CM

## 2017-07-19 PROCEDURE — 99213 OFFICE O/P EST LOW 20 MIN: CPT | Performed by: PODIATRIST

## 2017-07-19 NOTE — NURSING NOTE
"Chief Complaint   Patient presents with     Follow Up For     Foot Pain     right       Initial Pulse 103  Wt 102.5 kg (226 lb)  SpO2 97%  BMI 37.61 kg/m2 Estimated body mass index is 37.61 kg/(m^2) as calculated from the following:    Height as of 7/14/17: 1.651 m (5' 5\").    Weight as of this encounter: 102.5 kg (226 lb).  Medication Reconciliation: complete  "

## 2017-07-19 NOTE — MR AVS SNAPSHOT
After Visit Summary   7/19/2017    Francesca Moura    MRN: [id number]           Patient Information     Date Of Birth          1980        Visit Information        Provider Department      7/19/2017 11:15 AM Keith Michael DPM [facility]        Today's Diagnoses     Cavus deformity of foot    -  1    Right foot pain          Care Instructions    Weight management plan: Patient was referred to their PCP to discuss a diet and exercise plan.            Follow-ups after your visit        Who to contact     If you have questions or need follow up information about today's clinic visit or your schedule please contact [facility] directly at [phone].  Normal or non-critical lab and imaging results will be communicated to you by MyChart, letter or phone within 4 business days after the clinic has received the results. If you do not hear from us within 7 days, please contact the clinic through Joocehart or phone. If you have a critical or abnormal lab result, we will notify you by phone as soon as possible.  Submit refill requests through Datavolution or call your pharmacy and they will forward the refill request to us. Please allow 3 business days for your refill to be completed.          Additional Information About Your Visit        MyChart Information     Datavolution gives you secure access to your electronic health record. If you see a primary care provider, you can also send messages to your care team and make appointments. If you have questions, please call your primary care clinic.  If you do not have a primary care provider, please call [phone] and they will assist you.        Care EveryWhere ID     This is your Care EveryWhere ID. This could be used by other organizations to access your [place] medical records  [id number]        Your Vitals Were     Pulse Pulse Oximetry BMI (Body Mass Index)             103 97% 37.61 kg/m2          Blood Pressure from Last 3  Encounters:   07/14/17 134/88   04/05/17 133/87   03/22/17 123/79    Weight from Last 3 Encounters:   07/19/17 102.5 kg (226 lb)   07/14/17 102.5 kg (226 lb)   04/05/17 103.9 kg (229 lb)              Today, you had the following     No orders found for display       Primary Care Provider Office Phone # Fax #    Kristen M Kehr, PA-C [phone]       [facility] [address] [facility] [address]        Equal Access to Services     [facility]: Hadii aad ku hadasho Soomaali, waaxda luqadaha, qaybta kaalmada adeegyada, camille collado . So [facility] [phone].    ATENCIÓN: Si habla español, tiene a garcia disposición servicios gratuitos de asistencia lingüística. [facility] [phone].    We comply with applicable federal civil rights laws and [place] laws. We do not discriminate on the basis of race, color, national origin, age, disability sex, sexual orientation or gender identity.            Thank you!     Thank you for choosing [facility]  for your care. Our goal is always to provide you with excellent care. Hearing back from our patients is one way we can continue to improve our services. Please take a few minutes to complete the written survey that you may receive in the mail after your visit with us. Thank you!             Your Updated Medication List - Protect others around you: Learn how to safely use, store and throw away your medicines at www.disposemymeds.org.          This list is accurate as of: 7/19/17  3:48 PM.  Always use your most recent med list.                   Brand Name Dispense Instructions for use Diagnosis    ADDERALL 10 MG per tablet   Generic drug:  amphetamine-dextroamphetamine     75 tablet    Take 1 tablet (10 mg) by mouth 2 times daily Take a third tablet if needed        albuterol 108 (90 BASE) MCG/ACT Inhaler    PROAIR HFA/PROVENTIL HFA/VENTOLIN HFA    2 Inhaler    Inhale 2 puffs into the lungs every 4 hours as needed  for shortness of breath / dyspnea    Intermittent asthma, uncomplicated       ALLEGRA ALLERGY PO           amitriptyline 25 MG tablet    ELAVIL          medroxyPROGESTERone 150 MG/ML injection    DEPO-PROVERA    1 mL    Inject 1 mL (150 mg) into the muscle every 3 months    Encounter for surveillance of contraceptives       traMADol 50 MG tablet    ULTRAM    60 tablet    Take 1 tablet (50 mg) by mouth every 6 hours as needed for moderate pain

## 2017-07-19 NOTE — PROGRESS NOTES
Subjective:    4/23/14  Pt is seen today as a new pt referral from Kristen Kehr with the c/c of painful right foot.  This has been symptomatic for the past 6 months.  Pt denies any hx of trauma.  Aggravated by activity and releaved by rest.  Describes as burning pain.  Is slowly getting worse.  Points to lateral foot.  Walks on concrete 8 hours per day and socks around the house.     8/6/14  Patient wore cam walker for 5 weeks then went back to shoes.  No pain in cam walker.  She wears flimsily shoes at work and claims these shoes feel the best.  Her pain is unchanged.     7/19/17  Patient still having right foot pain.  Have not seen her in almost three years.  Still lateral foot.  Did get othtoics, but only minimal help.  She works at [employer] and walking all day there.  No pain in cam walker or if not working.        ROS:  Denies numbness, weakness, edema,  ecchymosis.       Allergies   Allergen Reactions     Bactrim Hives     Cephalexin Hives     Penicillins Hives     Vancomycin Hcl Hives     PERSIST EVEN DAY AFTER STOPPING MEDICATION.  leucopenia       Current Outpatient Prescriptions   Medication Sig Dispense Refill     amphetamine-dextroamphetamine (ADDERALL) 10 MG per tablet Take 1 tablet (10 mg) by mouth 2 times daily Take a third tablet if needed 75 tablet 0     traMADol (ULTRAM) 50 MG tablet Take 1 tablet (50 mg) by mouth every 6 hours as needed for moderate pain 60 tablet 0     albuterol (PROAIR HFA/PROVENTIL HFA/VENTOLIN HFA) 108 (90 BASE) MCG/ACT Inhaler Inhale 2 puffs into the lungs every 4 hours as needed for shortness of breath / dyspnea 2 Inhaler 3     medroxyPROGESTERone (DEPO-PROVERA) 150 MG/ML injection Inject 1 mL (150 mg) into the muscle every 3 months 1 mL 4     amitriptyline (ELAVIL) 25 MG tablet        Fexofenadine HCl (ALLEGRA ALLERGY PO)          Patient Active Problem List   Diagnosis     Panic attacks     Depression, major     CARDIOVASCULAR SCREENING; LDL GOAL LESS THAN 160     Neck  pain     Headache     Chest wall pain     Vitamin D deficiency     Sinusitis, chronic     Fatigue     Right tennis elbow     Gastroesophageal reflux disease without esophagitis     Tachycardia     Intermittent asthma, uncomplicated     Idiopathic hypersomnolence       Past Medical History:   Diagnosis Date     Allergies      Asthma, mild persistent      Depression, major      Depressive disorder      Fatigue 2014     Fatigue 2014     Genital warts      Headache 2010     Headache 2010     Narcolepsy      Overactive bladder      Panic attacks      Right tennis elbow 2016       Past Surgical History:   Procedure Laterality Date     ABDOMEN SURGERY  09         SURGICAL HISTORY OF -   2009    c section       Family History   Problem Relation Age of Onset     Hypertension Mother      Depression Mother      Hyperlipidemia Mother      Asthma Father      Allergies Father      Hyperlipidemia Father      Substance Abuse Father      Alcohol     Respiratory Maternal Grandmother      HEART DISEASE Maternal Grandfather      CEREBROVASCULAR DISEASE Maternal Grandfather      Other Cancer Maternal Grandfather      Skin     HEART DISEASE Paternal Grandmother      Colon Cancer Paternal Grandmother      CEREBROVASCULAR DISEASE Paternal Grandfather      Breast Cancer Other      Other Cancer Other      Lung     Other Cancer Other      Lung       Social History   Substance Use Topics     Smoking status: Former Smoker     Types: Cigarettes     Quit date: 3/8/2009     Smokeless tobacco: Never Used     Alcohol use No              O:  Patient good historian.  A&O X3.  Obese.  Pulses DP, PT 2/4 b/l.  CRT < 3 seconds X 10 digits.  No edema or varicosities noted.  Sensation to light touch intact b/l.   Skin has normal texture and turgor with hair growth b/l.  Cavus foot with weightbearing bilateral.  No forefoot or rear foot deformities noted.  MS 5/5 all compartments.  Normal ROM all fore foot and  rearfoot joints with no pain or crepitus.  No equinus.  Pain dorsal fourth tarsometatarsal joints right foot.  Slight edema.  No masses.  No ecchymosis.  Xrays unremarkable.      MR FOOT RIGHT WITHOUT CONTRAST August 19, 2014 at 1147 hours      HISTORY: Lateral forefoot pain for one year. No specific injury.        TECHNIQUE: Sagittal, long axis axial and short axis coronal T1 and  STIR images.     COMPARISON: None.     FINDINGS: There is very early degenerative change at the first  metatarsophalangeal joint and there is very mild hallux valgus  metatarsus varus deformity. There is a 0.7 cm cystic change at the  plantar aspect of the head of the first metatarsal bone, likely  secondary to degenerative change between the metatarsal head and  medial sesamoid bone. Remainder of the bony structures are  unremarkable. In particular, there is no evidence for stress fracture  or stress reaction in the lateral forefoot. Soft tissues of the  forefoot are also unremarkable.     IMPRESSION  IMPRESSION:  1. Mild degenerative change first metatarsophalangeal joint and  between the first metatarsal head and medial sesamoid bone.  2. No other abnormality.    A:  Right cavus foot with lateral pain    Plan:  Again discussed this is from overuse.  Discussed weight loss.  Discussed better work shoes.  Will try F8 ankle brace to see if this helps.  Return to clinic 4 weeks.        HARLEY VELAZQUEZ DPM, FACFAS

## 2017-09-21 ENCOUNTER — ALLIED HEALTH/NURSE VISIT (OUTPATIENT)
Dept: NURSING | Facility: CLINIC | Age: 37
End: 2017-09-21
Payer: COMMERCIAL

## 2017-09-21 VITALS — DIASTOLIC BLOOD PRESSURE: 88 MMHG | SYSTOLIC BLOOD PRESSURE: 134 MMHG

## 2017-09-21 DIAGNOSIS — Z30.9 CONTRACEPTIVE MANAGEMENT: Primary | ICD-10-CM

## 2017-09-21 PROCEDURE — 99207 ZZC NO CHARGE NURSE ONLY: CPT

## 2017-09-21 PROCEDURE — 96372 THER/PROPH/DIAG INJ SC/IM: CPT

## 2017-09-21 NOTE — MR AVS SNAPSHOT
After Visit Summary   9/21/2017    Francesca Moura    MRN: [id number]           Patient Information     Date Of Birth          1980        Visit Information        Provider Department      9/21/2017 10:00 AM AN ANCILLARY [facility]        Today's Diagnoses     Contraceptive management    -  1       Follow-ups after your visit        Who to contact     If you have questions or need follow up information about today's clinic visit or your schedule please contact [facility] directly at [phone].  Normal or non-critical lab and imaging results will be communicated to you by MyChart, letter or phone within 4 business days after the clinic has received the results. If you do not hear from us within 7 days, please contact the clinic through Video Recruithart or phone. If you have a critical or abnormal lab result, we will notify you by phone as soon as possible.  Submit refill requests through Wable Systems or call your pharmacy and they will forward the refill request to us. Please allow 3 business days for your refill to be completed.          Additional Information About Your Visit        MyChart Information     W[employer] Systems gives you secure access to your electronic health record. If you see a primary care provider, you can also send messages to your care team and make appointments. If you have questions, please call your primary care clinic.  If you do not have a primary care provider, please call [phone] and they will assist you.        Care EveryWhere ID     This is your Care EveryWhere ID. This could be used by other organizations to access your [place] medical records  [id number]         Blood Pressure from Last 3 Encounters:   09/21/17 134/88   07/14/17 134/88   04/05/17 133/87    Weight from Last 3 Encounters:   07/19/17 226 lb (102.5 kg)   07/14/17 226 lb (102.5 kg)   04/05/17 229 lb (103.9 kg)              We Performed the Following     Medroxyprogesterone inj/1mg (Depo  Provera J-Code)        Primary Care Provider Office Phone # Fax #    Imani HERNANDEZ Kehr, PA-C [phone] [address] [facility] [address]        Equal Access to Services     [facility] : Hadii aad ku hadasho Soomaali, waaxda luqadaha, qaybta kaalmada adeegyada, waxjalen idiin hayjaden adetyra ulloa lafabiolatyrell holliday. So [facility] [phone].    ATENCIÓN: Si habla español, tiene a garcia disposición servicios gratuitos de asistencia lingüística. Llame al [phone].    We comply with applicable federal civil rights laws and [place] laws. We do not discriminate on the basis of race, color, national origin, age, disability sex, sexual orientation or gender identity.            Thank you!     Thank you for choosing [facility]  for your care. Our goal is always to provide you with excellent care. Hearing back from our patients is one way we can continue to improve our services. Please take a few minutes to complete the written survey that you may receive in the mail after your visit with us. Thank you!             Your Updated Medication List - Protect others around you: Learn how to safely use, store and throw away your medicines at www.disposemymeds.org.          This list is accurate as of: 9/21/17 10:01 AM.  Always use your most recent med list.                   Brand Name Dispense Instructions for use Diagnosis    ADDERALL 10 MG per tablet   Generic drug:  amphetamine-dextroamphetamine     75 tablet    Take 1 tablet (10 mg) by mouth 2 times daily Take a third tablet if needed        albuterol 108 (90 BASE) MCG/ACT Inhaler    PROAIR HFA/PROVENTIL HFA/VENTOLIN HFA    2 Inhaler    Inhale 2 puffs into the lungs every 4 hours as needed for shortness of breath / dyspnea    Intermittent asthma, uncomplicated       ALLEGRA ALLERGY PO           amitriptyline 25 MG tablet    ELAVIL          medroxyPROGESTERone 150 MG/ML injection    DEPO-PROVERA    1 mL    Inject 1 mL (150 mg) into the muscle every 3  months    Encounter for surveillance of contraceptives       traMADol 50 MG tablet    ULTRAM    60 tablet    Take 1 tablet (50 mg) by mouth every 6 hours as needed for moderate pain

## 2017-09-21 NOTE — NURSING NOTE
BP: 134/88    LAST PAP/EXAM:   Lab Results   Component Value Date    PAP NIL 10/04/2012     URINE HCG:not indicated    The following medication was given:     MEDICATION: Depo Provera 150mg  ROUTE: IM  SITE: Deltoid - Left  DOSE: 150 mg  LOT #: [id number]  :  Hu[place]i   EXPIRATION DATE:  02/28/2020  NDC#: [address]-4  Next depo due Dec 7-21  Neli Armando M.A.  NEXT INJECTION DUE: 12/7/17 - 12/21/17   Provider: Kehr

## 2017-12-16 ENCOUNTER — HEALTH MAINTENANCE LETTER (OUTPATIENT)
Age: 37
End: 2017-12-16

## 2017-12-19 ENCOUNTER — TRANSFERRED RECORDS (OUTPATIENT)
Dept: HEALTH INFORMATION MANAGEMENT | Facility: CLINIC | Age: 37
End: 2017-12-19

## 2017-12-21 ENCOUNTER — ALLIED HEALTH/NURSE VISIT (OUTPATIENT)
Dept: NURSING | Facility: CLINIC | Age: 37
End: 2017-12-21
Payer: COMMERCIAL

## 2017-12-21 VITALS — DIASTOLIC BLOOD PRESSURE: 84 MMHG | SYSTOLIC BLOOD PRESSURE: 141 MMHG | HEART RATE: 96 BPM

## 2017-12-21 DIAGNOSIS — Z30.42 DEPO-PROVERA CONTRACEPTIVE STATUS: Primary | ICD-10-CM

## 2017-12-21 PROCEDURE — 96372 THER/PROPH/DIAG INJ SC/IM: CPT

## 2017-12-21 PROCEDURE — 99207 ZZC NO CHARGE NURSE ONLY: CPT

## 2017-12-21 NOTE — Clinical Note
Patient is on time for her Depo but orders have . She was instructed to make appointment. She states understanding.  Khushi Steve MA

## 2017-12-21 NOTE — MR AVS SNAPSHOT
After Visit Summary   12/21/2017    Francesca Moura    MRN: [id number]           Patient Information     Date Of Birth          1980        Visit Information        Provider Department      12/21/2017 3:45 PM AN ANCILLARY [facility]        Today's Diagnoses     Depo-Provera contraceptive status    -  1       Follow-ups after your visit        Who to contact     If you have questions or need follow up information about today's clinic visit or your schedule please contact [facility] directly at [phone].  Normal or non-critical lab and imaging results will be communicated to you by Aria Analyticshart, letter or phone within 4 business days after the clinic has received the results. If you do not hear from us within 7 days, please contact the clinic through Intact Medicalt or phone. If you have a critical or abnormal lab result, we will notify you by phone as soon as possible.  Submit refill requests through nWay or call your pharmacy and they will forward the refill request to us. Please allow 3 business days for your refill to be completed.          Additional Information About Your Visit        MyChart Information     nWay gives you secure access to your electronic health record. If you see a primary care provider, you can also send messages to your care team and make appointments. If you have questions, please call your primary care clinic.  If you do not have a primary care provider, please call [phone] and they will assist you.        Care EveryWhere ID     This is your Care EveryWhere ID. This could be used by other organizations to access your [place] medical records  [id number]        Your Vitals Were     Pulse                   96            Blood Pressure from Last 3 Encounters:   12/21/17 141/84   09/21/17 134/88   07/14/17 134/88    Weight from Last 3 Encounters:   07/19/17 226 lb (102.5 kg)   07/14/17 226 lb (102.5 kg)   04/05/17 229 lb (103.9 kg)               We Performed the Following     INJECTION INTRAMUSCULAR OR SUB-Q     Medroxyprogesterone inj/1mg (Depo Provera J-Code)          Today's Medication Changes          These changes are accurate as of 12/21/17 11:59 PM.  If you have any questions, ask your nurse or doctor.               These medicines have changed or have updated prescriptions.        Dose/Directions    * medroxyPROGESTERone 150 MG/ML injection   Commonly known as:  DEPO-PROVERA   This may have changed:  Another medication with the same name was added. Make sure you understand how and when to take each.   Used for:  Encounter for surveillance of contraceptives        Dose:  150 mg   Inject 1 mL (150 mg) into the muscle every 3 months   Quantity:  1 mL   Refills:  4       * medroxyPROGESTERone 150 MG/ML injection   Commonly known as:  DEPO-PROVERA   This may have changed:  You were already taking a medication with the same name, and this prescription was added. Make sure you understand how and when to take each.   Used for:  Depo-Provera contraceptive status        Dose:  150 mg   Inject 1 mL (150 mg) into the muscle every 3 months for 1 day   Quantity:  1 mL   Refills:  0       * Notice:  This list has 2 medication(s) that are the same as other medications prescribed for you. Read the directions carefully, and ask your doctor or other care provider to review them with you.         Where to get your medicines      Some of these will need a paper prescription and others can be bought over the counter.  Ask your nurse if you have questions.     You don't need a prescription for these medications     medroxyPROGESTERone 150 MG/ML injection                Primary Care Provider Office Phone # Fax #    Kristen M Kehr, PA-C [phone] [address] [address] [facility] [address]        Equal Access to Services     TOMAS[facility] RAMOS AH: Esperanza Vázquez, lu aldrich, camille morgan  ah. So [facility] [phone].    ATENCIÓN: Si lauren campa, tiene a garcia disposición servicios gratuitos de asistencia lingüística. Indira knapp [phone].    We comply with applicable federal civil rights laws and [place] laws. We do not discriminate on the basis of race, color, national origin, age, disability, sex, sexual orientation, or gender identity.            Thank you!     Thank you for choosing [facility]  for your care. Our goal is always to provide you with excellent care. Hearing back from our patients is one way we can continue to improve our services. Please take a few minutes to complete the written survey that you may receive in the mail after your visit with us. Thank you!             Your Updated Medication List - Protect others around you: Learn how to safely use, store and throw away your medicines at www.disposemymeds.org.          This list is accurate as of 12/21/17 11:59 PM.  Always use your most recent med list.                   Brand Name Dispense Instructions for use Diagnosis    ADDERALL 10 MG per tablet   Generic drug:  amphetamine-dextroamphetamine     75 tablet    Take 1 tablet (10 mg) by mouth 2 times daily Take a third tablet if needed        albuterol 108 (90 BASE) MCG/ACT Inhaler    PROAIR HFA/PROVENTIL HFA/VENTOLIN HFA    2 Inhaler    Inhale 2 puffs into the lungs every 4 hours as needed for shortness of breath / dyspnea    Intermittent asthma, uncomplicated       ALLEGRA ALLERGY PO           * medroxyPROGESTERone 150 MG/ML injection    DEPO-PROVERA    1 mL    Inject 1 mL (150 mg) into the muscle every 3 months    Encounter for surveillance of contraceptives       * medroxyPROGESTERone 150 MG/ML injection    DEPO-PROVERA    1 mL    Inject 1 mL (150 mg) into the muscle every 3 months for 1 day    Depo-Provera contraceptive status       traMADol 50 MG tablet    ULTRAM    60 tablet    Take 1 tablet (50 mg) by mouth every 6 hours as needed for moderate pain        *  Notice:  This list has 2 medication(s) that are the same as other medications prescribed for you. Read the directions carefully, and ask your doctor or other care provider to review them with you.

## 2017-12-21 NOTE — PROGRESS NOTES
BP: 141/84    LAST PAP/EXAM:   Lab Results   Component Value Date    PAP NIL 10/04/2012     URINE HCG:not indicated    The following medication was given:     MEDICATION: Depo Provera 150mg  ROUTE: IM  SITE: Arm - Right  : [employer]  LOT #: [id number]  EXP:5/2019  NEXT INJECTION DUE: 3/8/18 - 3/22/18   Provider: Lindsay Steve MA      Patient is on time for her injection, she was not told at her last injection she needed to be seen. Patient was informed that she needs to be seen before next injection, states understanding,    Khushi Steve MA

## 2017-12-22 RX ORDER — MEDROXYPROGESTERONE ACETATE 150 MG/ML
150 INJECTION, SUSPENSION INTRAMUSCULAR
Qty: 1 ML | Refills: 0 | OUTPATIENT
Start: 2017-12-22 | End: 2018-03-14

## 2018-01-22 ENCOUNTER — MYC MEDICAL ADVICE (OUTPATIENT)
Dept: FAMILY MEDICINE | Facility: CLINIC | Age: 38
End: 2018-01-22

## 2018-01-22 DIAGNOSIS — M79.7 FIBROMYALGIA: Primary | ICD-10-CM

## 2018-01-31 ENCOUNTER — TELEPHONE (OUTPATIENT)
Dept: FAMILY MEDICINE | Facility: CLINIC | Age: 38
End: 2018-01-31

## 2018-01-31 NOTE — TELEPHONE ENCOUNTER
1/31/2018    Call Regarding Preventive Health Screening Cervical/PAP AND PHYSICAL     Attempt 1    Message on voicemail    Comments:       Outreach   Inez Gonzalez

## 2018-02-23 NOTE — TELEPHONE ENCOUNTER
2/23/2018    Call Regarding Preventive Health Screening Cervical/PAP    Attempt 2    Message on voicemail     Comments:       Outreach   CC

## 2018-03-09 NOTE — TELEPHONE ENCOUNTER
3/9/2018    Call Regarding Preventive Health Screening Cervical/PAP    Attempt 3    Message on voicemail     Comments:           Outreach   AT

## 2018-03-14 ENCOUNTER — OFFICE VISIT (OUTPATIENT)
Dept: OBGYN | Facility: CLINIC | Age: 38
End: 2018-03-14
Payer: COMMERCIAL

## 2018-03-14 VITALS
BODY MASS INDEX: 38.44 KG/M2 | SYSTOLIC BLOOD PRESSURE: 131 MMHG | WEIGHT: 231 LBS | OXYGEN SATURATION: 98 % | DIASTOLIC BLOOD PRESSURE: 81 MMHG | HEART RATE: 96 BPM | TEMPERATURE: 97.8 F

## 2018-03-14 DIAGNOSIS — Z30.42 DEPO-PROVERA CONTRACEPTIVE STATUS: ICD-10-CM

## 2018-03-14 DIAGNOSIS — Z01.419 ENCOUNTER FOR GYNECOLOGICAL EXAMINATION WITHOUT ABNORMAL FINDING: Primary | ICD-10-CM

## 2018-03-14 PROCEDURE — 87624 HPV HI-RISK TYP POOLED RSLT: CPT | Performed by: NURSE PRACTITIONER

## 2018-03-14 PROCEDURE — 96372 THER/PROPH/DIAG INJ SC/IM: CPT | Performed by: NURSE PRACTITIONER

## 2018-03-14 PROCEDURE — G0145 SCR C/V CYTO,THINLAYER,RESCR: HCPCS | Performed by: NURSE PRACTITIONER

## 2018-03-14 PROCEDURE — 99395 PREV VISIT EST AGE 18-39: CPT | Mod: 25 | Performed by: NURSE PRACTITIONER

## 2018-03-14 RX ORDER — MEDROXYPROGESTERONE ACETATE 150 MG/ML
150 INJECTION, SUSPENSION INTRAMUSCULAR
Qty: 1 ML | Refills: 3 | OUTPATIENT
Start: 2018-03-14 | End: 2019-07-10

## 2018-03-14 NOTE — NURSING NOTE
"Chief Complaint   Patient presents with     Physical       Initial /81  Pulse 96  Temp 97.8  F (36.6  C) (Oral)  Wt 231 lb (104.8 kg)  SpO2 98%  BMI 38.44 kg/m2 Estimated body mass index is 38.44 kg/(m^2) as calculated from the following:    Height as of 7/14/17: 5' 5\" (1.651 m).    Weight as of this encounter: 231 lb (104.8 kg).  Medication Reconciliation: complete  Neli Armando M.A.    "

## 2018-03-14 NOTE — PROGRESS NOTES
SUBJECTIVE:   CC: Francesca Moura is an 37 year old woman who presents for preventive health visit.     Physical   Annual:     Getting at least 3 servings of Calcium per day::  Yes    Bi-annual eye exam::  Yes    Dental care twice a year::  Yes    Sleep apnea or symptoms of sleep apnea::  Daytime drowsiness    Diet::  Low salt and Low fat/cholesterol    Frequency of exercise::  4-5 days/week    Duration of exercise::  45-60 minutes    Taking medications regularly::  Yes    Medication side effects::  None    Additional concerns today::  No            Today's PHQ-2 Score:   PHQ-2 (  [employer]) 3/14/2018   Q1: Little interest or pleasure in doing things 0   Q2: Feeling down, depressed or hopeless 0   PHQ-2 Score 0   Q1: Little interest or pleasure in doing things Not at all   Q2: Feeling down, depressed or hopeless Not at all   PHQ-2 Score 0       Abuse: Current or Past(Physical, Sexual or Emotional)- No  Do you feel safe in your environment - Yes    Social History   Substance Use Topics     Smoking status: Former Smoker     Types: Cigarettes     Quit date: 3/8/2009     Smokeless tobacco: Never Used     Alcohol use No       Reviewed orders with patient.  Reviewed health maintenance and updated orders accordingly - Yes  Patient Active Problem List   Diagnosis     Panic attacks     Depression, major     CARDIOVASCULAR SCREENING; LDL GOAL LESS THAN 160     Neck pain     Headache     Chest wall pain     Vitamin D deficiency     Sinusitis, chronic     Fatigue     Right tennis elbow     Gastroesophageal reflux disease without esophagitis     Tachycardia     Intermittent asthma, uncomplicated     Idiopathic hypersomnolence     Past Surgical History:   Procedure Laterality Date     ABDOMEN SURGERY  09         SURGICAL HISTORY OF -   2009    c section       Social History   Substance Use Topics     Smoking status: Former Smoker     Types: Cigarettes     Quit date: 3/8/2009     Smokeless tobacco: Never Used      Alcohol use No     Family History   Problem Relation Age of Onset     Hypertension Mother      Depression Mother      Hyperlipidemia Mother      Asthma Father      Allergies Father      Hyperlipidemia Father      Substance Abuse Father      Alcohol     Respiratory Maternal Grandmother      HEART DISEASE Maternal Grandfather      CEREBROVASCULAR DISEASE Maternal Grandfather      Other Cancer Maternal Grandfather      Skin     HEART DISEASE Paternal Grandmother      Colon Cancer Paternal Grandmother      CEREBROVASCULAR DISEASE Paternal Grandfather      Breast Cancer Other      Other Cancer Other      Lung     Other Cancer Other      Lung           Mammogram not appropriate for this patient based on age.    Pertinent mammograms are reviewed under the imaging tab.  History of abnormal Pap smear:   NO - age 30-65 PAP every 5 years with negative HPV co-testing recommended  Last 3 Pap and HPV Results:   PAP / HPV 10/4/2012   PAP NIL       Reviewed and updated as needed this visit by clinical staff       Reviewed and updated as needed this visit by Provider        Past Medical History:   Diagnosis Date     Allergies      Asthma, mild persistent      Depression, major      Depressive disorder      Fatigue 2014     Fatigue 2014     Fatigue      Genital warts      Headache 2010     Headache 2010     Headache      Narcolepsy      Overactive bladder      Panic attacks      Right tennis elbow 2016      Past Surgical History:   Procedure Laterality Date     ABDOMEN SURGERY  09         SURGICAL HISTORY OF -   2009    c section       Review of Systems  C: NEGATIVE for fever, chills, change in weight  I: NEGATIVE for worrisome rashes, moles or lesions  E: NEGATIVE for vision changes or irritation  ENT: NEGATIVE for ear, mouth and throat problems  R: NEGATIVE for significant cough or SOB  B: NEGATIVE for masses, tenderness or discharge  CV: NEGATIVE for chest pain, palpitations or  peripheral edema  GI: NEGATIVE for nausea, abdominal pain, heartburn, or change in bowel habits  : NEGATIVE for unusual urinary or vaginal symptoms. Periods are suppressed with Depo Provera.  M: NEGATIVE for significant arthralgias or myalgia  N: NEGATIVE for weakness, dizziness or paresthesias  P: NEGATIVE for changes in mood or affect     OBJECTIVE:   Physical Exam   /81  Pulse 96  Temp 97.8  F (36.6  C) (Oral)  Wt 231 lb (104.8 kg)  SpO2 98%  BMI 38.44 kg/m2  GENERAL: healthy, alert and no distress  EYES: Eyes grossly normal to inspection, PERRL and conjunctivae and sclerae normal  HENT: ear canals and TM's normal, nose and mouth without ulcers or lesions  NECK: no adenopathy, no asymmetry, masses, or scars and thyroid normal to palpation  RESP: lungs clear to auscultation - no rales, rhonchi or wheezes  BREAST: normal without masses, tenderness or nipple discharge and no palpable axillary masses or adenopathy  CV: regular rate and rhythm, normal S1 S2, no S3 or S4, no murmur, click or rub, no peripheral edema and peripheral pulses strong  ABDOMEN: soft, nontender, no hepatosplenomegaly, no masses and bowel sounds normal   (female): normal female external genitalia, normal urethral meatus, vaginal mucosa pink, moist, well rugated, and normal cervix/adnexa/uterus without masses or discharge  MS: no gross musculoskeletal defects noted, no edema  SKIN: no suspicious lesions or rashes  NEURO: Normal strength and tone, mentation intact and speech normal  PSYCH: mentation appears normal, affect normal/bright    ASSESSMENT/PLAN:   1. Encounter for gynecological examination without abnormal finding  - Pap imaged thin layer screen with HPV - recommended age 30 - 65 years (select HPV order below)  - HPV High Risk Types DNA Cervical    2. Depo-Provera contraceptive status  Doing well on Depo Provera, due for injection now. Aware of when next dose is due.  - medroxyPROGESTERone (DEPO-PROVERA) 150 MG/ML  "injection; Inject 1 mL (150 mg) into the muscle every 3 months  Dispense: 1 mL; Refill: 3  - C Medroxyprogesterone inj/1mg    COUNSELING:  Reviewed preventive health counseling, as reflected in patient instructions  Special attention given to:        Regular exercise       Healthy diet/nutrition       Contraception         reports that she quit smoking about 9 years ago. Her smoking use included Cigarettes. She has never used smokeless tobacco.    Estimated body mass index is 37.61 kg/(m^2) as calculated from the following:    Height as of 7/14/17: 5' 5\" (1.651 m).    Weight as of 7/19/17: 226 lb (102.5 kg).       Counseling Resources:  ATP IV Guidelines  Pooled Cohorts Equation Calculator  Breast Cancer Risk Calculator  FRAX Risk Assessment  ICSI Preventive Guidelines  Dietary Guidelines for Americans, 2010  USDA's MyPlate  ASA Prophylaxis  Lung CA Screening    LORENA Messina [facility] [place]  Answers for HPI/ROS submitted by the patient on 3/14/2018   PHQ-2 Score: 0    "

## 2018-03-14 NOTE — NURSING NOTE
BP: 131/81    LAST PAP/EXAM:   Lab Results   Component Value Date    PAP NIL 10/04/2012     URINE HCG:not indicated    The following medication was given:     MEDICATION: Depo Provera 150mg  ROUTE: IM  SITE: Deltoid - Left  : [employer]  LOT #: [id number]  EXP:05/2020  NEXT INJECTION DUE: 5/30/18 - 6/13/18   Provider: Lindsay Stewart CMA

## 2018-03-16 LAB
COPATH REPORT: NORMAL
PAP: NORMAL

## 2018-03-20 LAB
FINAL DIAGNOSIS: NORMAL
HPV HR 12 DNA CVX QL NAA+PROBE: NEGATIVE
HPV16 DNA SPEC QL NAA+PROBE: NEGATIVE
HPV18 DNA SPEC QL NAA+PROBE: NEGATIVE
SPECIMEN DESCRIPTION: NORMAL
SPECIMEN SOURCE CVX/VAG CYTO: NORMAL

## 2018-03-31 DIAGNOSIS — J45.20 INTERMITTENT ASTHMA, UNCOMPLICATED: ICD-10-CM

## 2018-04-03 RX ORDER — ALBUTEROL SULFATE 90 UG/1
AEROSOL, METERED RESPIRATORY (INHALATION)
Qty: 17 INHALER | Refills: 0 | Status: SHIPPED | OUTPATIENT
Start: 2018-04-03 | End: 2019-05-19

## 2018-06-18 ENCOUNTER — ALLIED HEALTH/NURSE VISIT (OUTPATIENT)
Dept: NURSING | Facility: CLINIC | Age: 38
End: 2018-06-18
Payer: COMMERCIAL

## 2018-06-18 VITALS
HEART RATE: 89 BPM | BODY MASS INDEX: 37.11 KG/M2 | DIASTOLIC BLOOD PRESSURE: 85 MMHG | SYSTOLIC BLOOD PRESSURE: 142 MMHG | WEIGHT: 223 LBS

## 2018-06-18 DIAGNOSIS — Z30.42 DEPO-PROVERA CONTRACEPTIVE STATUS: Primary | ICD-10-CM

## 2018-06-18 LAB — BETA HCG QUAL IFA URINE: NEGATIVE

## 2018-06-18 PROCEDURE — 96372 THER/PROPH/DIAG INJ SC/IM: CPT

## 2018-06-18 PROCEDURE — 84703 CHORIONIC GONADOTROPIN ASSAY: CPT | Performed by: NURSE PRACTITIONER

## 2018-06-18 NOTE — MR AVS SNAPSHOT
After Visit Summary   6/18/2018    Francesca Moura    MRN: [id number]           Patient Information     Date Of Birth          1980        Visit Information        Provider Department      6/18/2018 10:30 AM AN ANCILLARY [facility]        Today's Diagnoses     Depo-Provera contraceptive status    -  1       Follow-ups after your visit        Who to contact     If you have questions or need follow up information about today's clinic visit or your schedule please contact [facility] directly at [phone].  Normal or non-critical lab and imaging results will be communicated to you by Maya's Momhart, letter or phone within 4 business days after the clinic has received the results. If you do not hear from us within 7 days, please contact the clinic through Kloodt or phone. If you have a critical or abnormal lab result, we will notify you by phone as soon as possible.  Submit refill requests through KUBOO or call your pharmacy and they will forward the refill request to us. Please allow 3 business days for your refill to be completed.          Additional Information About Your Visit        MyChart Information     KUBOO gives you secure access to your electronic health record. If you see a primary care provider, you can also send messages to your care team and make appointments. If you have questions, please call your primary care clinic.  If you do not have a primary care provider, please call [phone] and they will assist you.        Care EveryWhere ID     This is your Care EveryWhere ID. This could be used by other organizations to access your [place] medical records  [id number]        Your Vitals Were     Pulse Breastfeeding? BMI (Body Mass Index)             89 No 37.11 kg/m2          Blood Pressure from Last 3 Encounters:   06/18/18 142/85   03/14/18 131/81   12/21/17 141/84    Weight from Last 3 Encounters:   06/18/18 223 lb (101.2 kg)   03/14/18 231 lb (104.8 kg)    07/19/17 226 lb (102.5 kg)              We Performed the Following     Beta HCG Qual, Urine - [facility] and [place] ([id number])     C Medroxyprogesterone inj/1mg     INJECTION INTRAMUSCULAR OR SUB-Q        Primary Care Provider Office Phone # Fax #    Imani M Kehr, PA-C [phone] [address] [facility] [address]        Equal Access to Services     TOMASZ RAMOS : Hadii aad ku hadasho Soomaali, waaxda luqadaha, qaybta kaalmada adeegyada, waxay idiin hayaan adeeg kharash la'derek . So [facility] [phone].    ATENCIÓN: Si lauren esppauly, tiene a garcia disposición servicios gratuitos de asistencia lingüística. Llame al [phone].    We comply with applicable federal civil rights laws and [place] laws. We do not discriminate on the basis of race, color, national origin, age, disability, sex, sexual orientation, or gender identity.            Thank you!     Thank you for choosing [facility]  for your care. Our goal is always to provide you with excellent care. Hearing back from our patients is one way we can continue to improve our services. Please take a few minutes to complete the written survey that you may receive in the mail after your visit with us. Thank you!             Your Updated Medication List - Protect others around you: Learn how to safely use, store and throw away your medicines at www.disposemymeds.org.          This list is accurate as of 6/18/18 11:41 AM.  Always use your most recent med list.                   Brand Name Dispense Instructions for use Diagnosis    ADDERALL 10 MG per tablet   Generic drug:  amphetamine-dextroamphetamine     75 tablet    Take 1 tablet (10 mg) by mouth 2 times daily Take a third tablet if needed        ALLEGRA ALLERGY PO           amitriptyline 25 MG tablet    ELAVIL    90 tablet    Take 1 tablet (25 mg) by mouth At Bedtime    Fibromyalgia       medroxyPROGESTERone 150 MG/ML injection    DEPO-PROVERA    1 mL    Inject 1 mL (150 mg)  into the muscle every 3 months    Depo-Provera contraceptive status       PROAIR  (90 Base) MCG/ACT Inhaler   Generic drug:  albuterol     17 Inhaler    INHALE 2 PUFFS BY MOUTH EVERY 4 HOURS AS NEEDED FOR SHORTNESS OF BREATH/DYSPNEA    Intermittent asthma, uncomplicated       traMADol 50 MG tablet    ULTRAM    60 tablet    Take 1 tablet (50 mg) by mouth every 6 hours as needed for moderate pain        VITAMIN D (CHOLECALCIFEROL) PO      Take 1,000 Units by mouth daily

## 2018-09-17 ENCOUNTER — ALLIED HEALTH/NURSE VISIT (OUTPATIENT)
Dept: NURSING | Facility: CLINIC | Age: 38
End: 2018-09-17
Payer: COMMERCIAL

## 2018-09-17 VITALS — DIASTOLIC BLOOD PRESSURE: 86 MMHG | SYSTOLIC BLOOD PRESSURE: 134 MMHG

## 2018-09-17 PROCEDURE — 96372 THER/PROPH/DIAG INJ SC/IM: CPT

## 2018-09-17 PROCEDURE — 99207 ZZC NO CHARGE LOS: CPT

## 2018-09-17 NOTE — MR AVS SNAPSHOT
After Visit Summary   9/17/2018    Francesca Moura    MRN: [id number]           Patient Information     Date Of Birth          1980        Visit Information        Provider Department      9/17/2018 2:00 PM FZ [facility]LL[facility]        Today's Diagnoses     Contraception    -  1       Follow-ups after your visit        Who to contact     If you have questions or need follow up information about today's clinic visit or your schedule please contact [facility] directly at [phone].  Normal or non-critical lab and imaging results will be communicated to you by MyChart, letter or phone within 4 business days after the clinic has received the results. If you do not hear from us within 7 days, please contact the clinic through Paperfoldhart or phone. If you have a critical or abnormal lab result, we will notify you by phone as soon as possible.  Submit refill requests through [employer] or call your pharmacy and they will forward the refill request to us. Please allow 3 business days for your refill to be completed.          Additional Information About Your Visit        MyChart Information     [employer] gives you secure access to your electronic health record. If you see a primary care provider, you can also send messages to your care team and make appointments. If you have questions, please call your primary care clinic.  If you do not have a primary care provider, please call [phone] and they will assist you.        Care EveryWhere ID     This is your Care EveryWhere ID. This could be used by other organizations to access your [place] medical records  [id number]         Blood Pressure from Last 3 Encounters:   09/17/18 134/86   06/18/18 142/85   03/14/18 131/81    Weight from Last 3 Encounters:   06/18/18 223 lb (101.2 kg)   03/14/18 231 lb (104.8 kg)   07/19/17 226 lb (102.5 kg)              We Performed the Following     INJECTION INTRAMUSCULAR OR SUB-Q      Medroxyprogesterone inj  1mg   (Depo Provera J-Code)        Primary Care Provider Office Phone # Fax #    Kristen M Kehr, PA-C [phone] [address] [address] [facility] [address]        Equal Access to Services     [facility] Hospital[facility]NALDO : Hadii aad ku hadasho Soomaali, waaxda luqadaha, qaybta kaalmada adeegyada, waxay idiin hayjaden adetyra artemio laousmane . So [facility] [phone].    ATENCIÓN: Si habla español, tiene a garcia disposición servicios gratuitos de asistencia lingüística. [facility] [phone].    We comply with applicable federal civil rights laws and [place] laws. We do not discriminate on the basis of race, color, national origin, age, disability, sex, sexual orientation, or gender identity.            Thank you!     Thank you for choosing [facility]  for your care. Our goal is always to provide you with excellent care. Hearing back from our patients is one way we can continue to improve our services. Please take a few minutes to complete the written survey that you may receive in the mail after your visit with us. Thank you!             Your Updated Medication List - Protect others around you: Learn how to safely use, store and throw away your medicines at www.disposemymeds.org.          This list is accurate as of 9/17/18  2:52 PM.  Always use your most recent med list.                   Brand Name Dispense Instructions for use Diagnosis    ADDERALL 10 MG per tablet   Generic drug:  amphetamine-dextroamphetamine     75 tablet    Take 1 tablet (10 mg) by mouth 2 times daily Take a third tablet if needed        ALLEGRA ALLERGY PO           amitriptyline 25 MG tablet    ELAVIL    90 tablet    Take 1 tablet (25 mg) by mouth At Bedtime    Fibromyalgia       medroxyPROGESTERone 150 MG/ML injection    DEPO-PROVERA    1 mL    Inject 1 mL (150 mg) into the muscle every 3 months    Depo-Provera contraceptive status       PROAIR  (90 Base) MCG/ACT inhaler   Generic drug:  albuterol      17 Inhaler    INHALE 2 PUFFS BY MOUTH EVERY 4 HOURS AS NEEDED FOR SHORTNESS OF BREATH/DYSPNEA    Intermittent asthma, uncomplicated       traMADol 50 MG tablet    ULTRAM    60 tablet    Take 1 tablet (50 mg) by mouth every 6 hours as needed for moderate pain        VITAMIN D (CHOLECALCIFEROL) PO      Take 1,000 Units by mouth daily

## 2018-09-17 NOTE — NURSING NOTE
Prior to injection verified patient identity using patient's name and date of birth.    BP: 134/86    LAST PAP/EXAM: Lab Results       Component                Value               Date                       PAP                      NIL                 03/14/2018             The following medication was given:     MEDICATION: Depo Provera 150mg  ROUTE: IM  SITE: Deltoid - Right  : [employer]  LOT #: [id number]  EXP:05/2020  NEXT INJECTION DUE: 12/3/18 - 12/17/18   Provider: Kehr Jennifer A., CMA ([facility])

## 2018-12-03 ENCOUNTER — OFFICE VISIT (OUTPATIENT)
Dept: FAMILY MEDICINE | Facility: CLINIC | Age: 38
End: 2018-12-03
Payer: COMMERCIAL

## 2018-12-03 VITALS
HEART RATE: 84 BPM | DIASTOLIC BLOOD PRESSURE: 79 MMHG | TEMPERATURE: 96.8 F | OXYGEN SATURATION: 98 % | SYSTOLIC BLOOD PRESSURE: 126 MMHG | WEIGHT: 228 LBS | BODY MASS INDEX: 37.99 KG/M2 | HEIGHT: 65 IN | RESPIRATION RATE: 17 BRPM

## 2018-12-03 DIAGNOSIS — J45.21 MILD INTERMITTENT ASTHMA WITH EXACERBATION: Primary | ICD-10-CM

## 2018-12-03 PROBLEM — E66.01 MORBID OBESITY (H): Status: ACTIVE | Noted: 2018-12-03

## 2018-12-03 PROCEDURE — 99213 OFFICE O/P EST LOW 20 MIN: CPT | Performed by: FAMILY MEDICINE

## 2018-12-03 RX ORDER — AZITHROMYCIN 250 MG/1
TABLET, FILM COATED ORAL
Qty: 6 TABLET | Refills: 0 | Status: SHIPPED | OUTPATIENT
Start: 2018-12-03 | End: 2019-07-10

## 2018-12-03 ASSESSMENT — ANXIETY QUESTIONNAIRES
GAD7 TOTAL SCORE: 14
3. WORRYING TOO MUCH ABOUT DIFFERENT THINGS: MORE THAN HALF THE DAYS
6. BECOMING EASILY ANNOYED OR IRRITABLE: NEARLY EVERY DAY
2. NOT BEING ABLE TO STOP OR CONTROL WORRYING: NEARLY EVERY DAY
1. FEELING NERVOUS, ANXIOUS, OR ON EDGE: NEARLY EVERY DAY
IF YOU CHECKED OFF ANY PROBLEMS ON THIS QUESTIONNAIRE, HOW DIFFICULT HAVE THESE PROBLEMS MADE IT FOR YOU TO DO YOUR WORK, TAKE CARE OF THINGS AT HOME, OR GET ALONG WITH OTHER PEOPLE: SOMEWHAT DIFFICULT
7. FEELING AFRAID AS IF SOMETHING AWFUL MIGHT HAPPEN: SEVERAL DAYS
5. BEING SO RESTLESS THAT IT IS HARD TO SIT STILL: NOT AT ALL

## 2018-12-03 ASSESSMENT — PATIENT HEALTH QUESTIONNAIRE - PHQ9
5. POOR APPETITE OR OVEREATING: MORE THAN HALF THE DAYS
SUM OF ALL RESPONSES TO PHQ QUESTIONS 1-9: 13

## 2018-12-03 NOTE — MR AVS SNAPSHOT
"              After Visit Summary   12/3/2018    Francesca Moura    MRN: [id number]           Patient Information     Date Of Birth          1980        Visit Information        Provider Department      12/3/2018 10:00 AM Zoë Allred MD [facility]        Today's Diagnoses     Mild intermittent asthma with exacerbation    -  1       Follow-ups after your visit        Follow-up notes from your care team     Return in about 1 year (around 12/3/2019) for prn.      Who to contact     If you have questions or need follow up information about today's clinic visit or your schedule please contact [facility] directly at [phone].  Normal or non-critical lab and imaging results will be communicated to you by MyChart, letter or phone within 4 business days after the clinic has received the results. If you do not hear from us within 7 days, please contact the clinic through Common [employer] Communitieshart or phone. If you have a critical or abnormal lab result, we will notify you by phone as soon as possible.  Submit refill requests through [employer] or call your pharmacy and they will forward the refill request to us. Please allow 3 business days for your refill to be completed.          Additional Information About Your Visit        MyChart Information     SkyRank gives you secure access to your electronic health record. If you see a primary care provider, you can also send messages to your care team and make appointments. If you have questions, please call your primary care clinic.  If you do not have a primary care provider, please call [phone] and they will assist you.        Care EveryWhere ID     This is your Care EveryWhere ID. This could be used by other organizations to access your [place] medical records  [id number]        Your Vitals Were     Pulse Temperature Respirations Height Pulse Oximetry BMI (Body Mass Index)    84 96.8  F (36  C) (Oral) 17 5' 5\" (1.651 m) 98% 37.94 kg/m2       Blood " Pressure from Last 3 Encounters:   12/03/18 126/79   09/17/18 134/86   06/18/18 142/85    Weight from Last 3 Encounters:   12/03/18 228 lb (103.4 kg)   06/18/18 223 lb (101.2 kg)   03/14/18 231 lb (104.8 kg)              Today, you had the following     No orders found for display         Today's Medication Changes          These changes are accurate as of 12/3/18 10:57 AM.  If you have any questions, ask your nurse or doctor.               Start taking these medicines.        Dose/Directions    azithromycin 250 MG tablet   Commonly known as:  ZITHROMAX   Used for:  Mild intermittent asthma with exacerbation   Started by:  Zoë Allred MD        Two tablets first day, then one tablet daily for four days.   Quantity:  6 tablet   Refills:  0            Where to get your medicines      These medications were sent to [facility]/pharmacy #[id number] - [place], [place]  [address]  [address] [facility] [address]     Phone:  [phone]     azithromycin 250 MG tablet                Primary Care Provider Office Phone # Fax #    Kristen M Kehr, PA-C [phone] [address] [facility] [address]        Equal Access to Services     TOMASZ RAMOS : Esperanza melendezo Sokeith, waaxda luqadaha, qaybta kaalmada adeegyada, camille holliday. So [facility] [phone].    ATENCIÓN: Si habla español, tiene a garcia disposición servicios gratuitos de asistencia lingüística. Indira al [phone].    We comply with applicable federal civil rights laws and [place] laws. We do not discriminate on the basis of race, color, national origin, age, disability, sex, sexual orientation, or gender identity.            Thank you!     Thank you for choosing [facility]  for your care. Our goal is always to provide you with excellent care. Hearing back from our patients is one way we can continue to improve our services. Please take a few minutes to complete the written survey that you may  receive in the mail after your visit with us. Thank you!             Your Updated Medication List - Protect others around you: Learn how to safely use, store and throw away your medicines at www.disposemymeds.org.          This list is accurate as of 12/3/18 10:57 AM.  Always use your most recent med list.                   Brand Name Dispense Instructions for use Diagnosis    ADDERALL 10 MG tablet   Generic drug:  amphetamine-dextroamphetamine     75 tablet    Take 1 tablet (10 mg) by mouth 2 times daily Take a third tablet if needed        amitriptyline 25 MG tablet    ELAVIL    90 tablet    Take 1 tablet (25 mg) by mouth At Bedtime    Fibromyalgia       azithromycin 250 MG tablet    ZITHROMAX    6 tablet    Two tablets first day, then one tablet daily for four days.    Mild intermittent asthma with exacerbation       medroxyPROGESTERone 150 MG/ML IM injection    DEPO-PROVERA    1 mL    Inject 1 mL (150 mg) into the muscle every 3 months    Depo-Provera contraceptive status       PROAIR  (90 Base) MCG/ACT inhaler   Generic drug:  albuterol     17 Inhaler    INHALE 2 PUFFS BY MOUTH EVERY 4 HOURS AS NEEDED FOR SHORTNESS OF BREATH/DYSPNEA    Intermittent asthma, uncomplicated       traMADol 50 MG tablet    ULTRAM    60 tablet    Take 1 tablet (50 mg) by mouth every 6 hours as needed for moderate pain        VITAMIN D (CHOLECALCIFEROL) PO      Take 1,000 Units by mouth daily

## 2018-12-03 NOTE — LETTER
My Asthma Action Plan  Name: Francesca Moura   YOB: 1980  Date: 12/3/2018   My doctor: Zoë Castillo MD   My clinic: [facility]        My Control Medicine: Fluticasone propionate (Flovent) -  Diskus 100 mcg as directed  My Rescue Medicine: Albuterol (Proair/Ventolin/Proventil) inhaler [id number]   My Asthma Severity: intermittent  Avoid your asthma triggers: upper respiratory infections               GREEN ZONE   Good Control    I feel good    No cough or wheeze    Can work, sleep and play without asthma symptoms       Take your asthma control medicine every day.     1. If exercise triggers your asthma, take your rescue medication    15 minutes before exercise or sports, and    During exercise if you have asthma symptoms  2. Spacer to use with inhaler: If you have a spacer, make sure to use it with your inhaler             YELLOW ZONE Getting Worse  I have ANY of these:    I do not feel good    Cough or wheeze    Chest feels tight    Wake up at night   1. Keep taking your Green Zone medications  2. Start taking your rescue medicine:    every 20 minutes for up to 1 hour. Then every 4 hours for 24-48 hours.  3. If you stay in the Yellow Zone for more than 12-24 hours, contact your doctor.  4. If you do not return to the Green Zone in 12-24 hours or you get worse, start taking your oral steroid medicine if prescribed by your provider.           RED ZONE Medical Alert - Get Help  I have ANY of these:    I feel awful    Medicine is not helping    Breathing getting harder    Trouble walking or talking    Nose opens wide to breathe       1. Take your rescue medicine NOW  2. If your provider has prescribed an oral steroid medicine, start taking it NOW  3. Call your doctor NOW  4. If you are still in the Red Zone after 20 minutes and you have not reached your doctor:    Take your rescue medicine again and    Call 911 or go to the emergency room right away    See your regular doctor within 2 weeks of  an Emergency Room or Urgent Care visit for follow-up treatment.          Annual Reminders:  Meet with Asthma Educator,  Flu Shot in the Fall, consider Pneumonia Vaccination for patients with asthma (aged 19 and older).    Pharmacy: [facility] #[id number]  [place] [address]                      Asthma Triggers  How To Control Things That Make Your Asthma Worse    Triggers are things that make your asthma worse.  Look at the list below to help you find your triggers and what you can do about them.  You can help prevent asthma flare-ups by staying away from your triggers.      Trigger                                                          What you can do   Cigarette Smoke  Tobacco smoke can make asthma worse. Do not allow smoking in your home, car or around you.  Be sure no one smokes at a child s day care or school.  If you smoke, ask your health care provider for ways to help you quit.  Ask family members to quit too.  Ask your health care provider for a referral to Quit Plan to help you quit smoking, or call 1-565-566-PLAN.     Colds, Flu, Bronchitis  These are common triggers of asthma. Wash your hands often.  Don t touch your eyes, nose or mouth.  Get a flu shot every year.     Dust Mites  These are tiny bugs that live in cloth or carpet. They are too small to see. Wash sheets and blankets in hot water every week.   Encase pillows and mattress in dust mite proof covers.  Avoid having carpet if you can. If you have carpet, vacuum weekly.   Use a dust mask and HEPA vacuum.   Pollen and Outdoor Mold  Some people are allergic to trees, grass, or weed pollen, or molds. Try to keep your windows closed.  Limit time out doors when pollen count is high.   Ask you health care provider about taking medicine during allergy season.     Animal Dander  Some people are allergic to skin flakes, urine or saliva from pets with fur or feathers. Keep pets with fur or feathers out of your home.    If you can t keep the  pet outdoors, then keep the pet out of your bedroom.  Keep the bedroom door closed.  Keep pets off cloth furniture and away from stuffed toys.     Mice, Rats, and Cockroaches  Some people are allergic to the waste from these pests.   Cover food and garbage.  Clean up spills and food crumbs.  Store grease in the refrigerator.   Keep food out of the bedroom.   Indoor Mold  This can be a trigger if your home has high moisture. Fix leaking faucets, pipes, or other sources of water.   Clean moldy surfaces.  Dehumidify basement if it is damp and smelly.   Smoke, Strong Odors, and Sprays  These can reduce air quality. Stay away from strong odors and sprays, such as perfume, powder, hair spray, paints, smoke incense, paint, cleaning products, candles and new carpet.   Exercise or Sports  Some people with asthma have this trigger. Be active!  Ask your doctor about taking medicine before sports or exercise to prevent symptoms.    Warm up for 5-10 minutes before and after sports or exercise.     Other Triggers of Asthma  Cold air:  Cover your nose and mouth with a scarf.  Sometimes laughing or crying can be a trigger.  Some medicines and food can trigger asthma.

## 2018-12-03 NOTE — PROGRESS NOTES
"  SUBJECTIVE:   Francesca Moura is a 38 year old female who presents to clinic today for the following health issues:        ENT Symptoms             Symptoms: cc Present Absent Comment   Fever/Chills  x  Last night    Fatigue  x     Muscle Aches   x    Eye Irritation   x    Sneezing  x     Nasal Jordon/Drg  x     Sinus Pressure/Pain  x     Loss of smell  x     Dental pain   x    Sore Throat   x    Swollen Glands  x     Ear Pain/Fullness  x     Cough  x     Wheeze  x     Chest Pain  x     Shortness of breath  x     Rash   x    Other  x  headache     Symptom duration:  5 days    Symptom severity:     Treatments tried:  zpack prior to the 5 days of sick   Contacts:  boy friend         Pt finished zpak about 2 weeks ago  Now with recurrent symptoms, mainly in chest this time  Has restarted her flovent yesterday.  Using albuterol about 4-5 times yesterday  She is not a smoker    Problem list and histories reviewed & adjusted, as indicated.  Additional history: as documented    Labs reviewed in EPIC    Reviewed and updated as needed this visit by clinical staff  Tobacco  Allergies  Meds  Med Hx  Surg Hx  Fam Hx  Soc Hx      Reviewed and updated as needed this visit by Provider         ROS:  Constitutional, HEENT, cardiovascular, pulmonary, gi and gu systems are negative, except as otherwise noted.    OBJECTIVE:     /79  Pulse 84  Temp 96.8  F (36  C) (Oral)  Resp 17  Ht 5' 5\" (1.651 m)  Wt 228 lb (103.4 kg)  SpO2 98%  BMI 37.94 kg/m2  Body mass index is 37.94 kg/(m^2).  GENERAL: healthy, alert and no distress  HENT: ear canals and TM's normal, nose and mouth without ulcers or lesions  NECK: no adenopathy, no asymmetry, masses, or scars and thyroid normal to palpation  RESP: lungs clear to auscultation - no rales, rhonchi or wheezes and expiratory wheezes throughout  CV: regular rate and rhythm, normal S1 S2, no S3 or S4, no murmur, click or rub, no peripheral edema and peripheral pulses " strong    Diagnostic Test Results:  none     ASSESSMENT/PLAN:     1. Mild intermittent asthma with exacerbation  Zpak, continue flovent and albuterol, follow-up if not improving over next few days or if worsening  - azithromycin (ZITHROMAX) 250 MG tablet; Two tablets first day, then one tablet daily for four days.  Dispense: 6 tablet; Refill: 0          Zoë Castillo MD  [facility]

## 2018-12-04 ASSESSMENT — ANXIETY QUESTIONNAIRES: GAD7 TOTAL SCORE: 14

## 2018-12-04 ASSESSMENT — ASTHMA QUESTIONNAIRES: ACT_TOTALSCORE: 23

## 2018-12-28 ENCOUNTER — TELEPHONE (OUTPATIENT)
Dept: PEDIATRICS | Facility: CLINIC | Age: 38
End: 2018-12-28

## 2019-01-03 ENCOUNTER — MYC MEDICAL ADVICE (OUTPATIENT)
Dept: OBGYN | Facility: CLINIC | Age: 39
End: 2019-01-03

## 2019-01-03 NOTE — TELEPHONE ENCOUNTER
It looks like she was overdue since 12/17/18.      Ok for depo provera injection if the pregnancy test is negative and she can be sure there is no possibility of pregnancy.   Pregnancy tests can be falsely negative early in pregnancy.      Also help her schedule her annual exam in March, when she will be due

## 2019-01-22 NOTE — TELEPHONE ENCOUNTER
Called and spoke with patient. Patient is now set up with the nurse for depo and will need to take pregnancy test prior to inj.    Alicia Hung  Women's Health

## 2019-01-23 ENCOUNTER — ALLIED HEALTH/NURSE VISIT (OUTPATIENT)
Dept: NURSING | Facility: CLINIC | Age: 39
End: 2019-01-23
Payer: COMMERCIAL

## 2019-01-23 VITALS
WEIGHT: 232 LBS | DIASTOLIC BLOOD PRESSURE: 77 MMHG | HEART RATE: 90 BPM | SYSTOLIC BLOOD PRESSURE: 134 MMHG | BODY MASS INDEX: 38.61 KG/M2

## 2019-01-23 DIAGNOSIS — Z30.42 SURVEILLANCE FOR INJECTABLE MEDROXYPROGESTERONE/ESTRADIOL: ICD-10-CM

## 2019-01-23 DIAGNOSIS — Z30.42 SURVEILLANCE FOR INJECTABLE MEDROXYPROGESTERONE/ESTRADIOL: Primary | ICD-10-CM

## 2019-01-23 LAB — BETA HCG QUAL IFA URINE: NEGATIVE

## 2019-01-23 PROCEDURE — 99207 ZZC NO CHARGE NURSE ONLY: CPT

## 2019-01-23 PROCEDURE — 96372 THER/PROPH/DIAG INJ SC/IM: CPT

## 2019-01-23 PROCEDURE — 84703 CHORIONIC GONADOTROPIN ASSAY: CPT | Performed by: OBSTETRICS & GYNECOLOGY

## 2019-01-23 RX ORDER — MEDROXYPROGESTERONE ACETATE 150 MG/ML
150 INJECTION, SUSPENSION INTRAMUSCULAR ONCE
Status: COMPLETED | OUTPATIENT
Start: 2019-01-23 | End: 2019-01-23

## 2019-01-23 RX ADMIN — MEDROXYPROGESTERONE ACETATE 150 MG: 150 INJECTION, SUSPENSION INTRAMUSCULAR at 14:37

## 2019-05-19 DIAGNOSIS — J45.20 INTERMITTENT ASTHMA, UNCOMPLICATED: ICD-10-CM

## 2019-05-21 RX ORDER — ALBUTEROL SULFATE 90 UG/1
AEROSOL, METERED RESPIRATORY (INHALATION)
Qty: 18 G | Refills: 0 | Status: SHIPPED | OUTPATIENT
Start: 2019-05-21 | End: 2019-06-17

## 2019-07-10 ENCOUNTER — OFFICE VISIT (OUTPATIENT)
Dept: OBGYN | Facility: CLINIC | Age: 39
End: 2019-07-10
Payer: COMMERCIAL

## 2019-07-10 VITALS
BODY MASS INDEX: 38.32 KG/M2 | WEIGHT: 230 LBS | TEMPERATURE: 98.4 F | DIASTOLIC BLOOD PRESSURE: 88 MMHG | OXYGEN SATURATION: 100 % | HEART RATE: 87 BPM | HEIGHT: 65 IN | SYSTOLIC BLOOD PRESSURE: 141 MMHG

## 2019-07-10 DIAGNOSIS — Z01.419 ENCOUNTER FOR GYNECOLOGICAL EXAMINATION WITHOUT ABNORMAL FINDING: Primary | ICD-10-CM

## 2019-07-10 DIAGNOSIS — Z30.013 INITIATION OF DEPO PROVERA: ICD-10-CM

## 2019-07-10 PROCEDURE — 99395 PREV VISIT EST AGE 18-39: CPT | Performed by: NURSE PRACTITIONER

## 2019-07-10 RX ORDER — MEDROXYPROGESTERONE ACETATE 150 MG/ML
150 INJECTION, SUSPENSION INTRAMUSCULAR
Status: ACTIVE | OUTPATIENT
Start: 2019-07-10 | End: 2020-07-04

## 2019-07-10 ASSESSMENT — PAIN SCALES - GENERAL: PAINLEVEL: NO PAIN (0)

## 2019-07-10 ASSESSMENT — MIFFLIN-ST. JEOR: SCORE: 1721.21

## 2019-07-10 NOTE — PROGRESS NOTES
"Subjective     Francesca Moura is a 38 year old female who presents to clinic today for the following health issues:    HPI   Birth control counseling    {additonal problems for provider to add (Optional):292444}    {HIST REVIEW/ LINKS 2 (Optional):507513}    {Additional problems for the provider to add (optional):956403}  Reviewed and updated as needed this visit by Provider         Review of Systems   {ROS COMP (Optional):553052}      Objective    There were no vitals taken for this visit.  There is no height or weight on file to calculate BMI.  Physical Exam   {Exam List (Optional):124013}    {Diagnostic Test Results (Optional):744644::\"Diagnostic Test Results:\",\"Labs reviewed in Epic\"}        {PROVIDER CHARTING PREFERENCE:282615}      "

## 2019-07-10 NOTE — PROGRESS NOTES
SUBJECTIVE:   CC: Francesca Moura is an 38 year old woman who presents for preventive health visit.     Healthy Habits:     Getting at least 3 servings of Calcium per day:  Yes    Bi-annual eye exam:  Yes    Dental care twice a year:  Yes    Sleep apnea or symptoms of sleep apnea:  Daytime drowsiness    Diet:  Low salt    Frequency of exercise:  4-5 days/week    Duration of exercise:  30-45 minutes    Taking medications regularly:  Yes    Medication side effects:  None    PHQ-2 Total Score: 0    Additional concerns today:  No        Today's PHQ-2 Score:   PHQ-2 ( 1999 [employer]) 7/10/2019   Q1: Little interest or pleasure in doing things 0   Q2: Feeling down, depressed or hopeless 0   PHQ-2 Score 0   Q1: Little interest or pleasure in doing things Not at all   Q2: Feeling down, depressed or hopeless Not at all   PHQ-2 Score 0       Abuse: Current or Past(Physical, Sexual or Emotional)- No  Do you feel safe in your environment? Yes    Social History     Tobacco Use     Smoking status: Former Smoker     Types: Cigarettes     Last attempt to quit: 3/8/2009     Years since quitting: 10.3     Smokeless tobacco: Never Used   Substance Use Topics     Alcohol use: No         Alcohol Use 7/10/2019   Prescreen: >3 drinks/day or >7 drinks/week? No   Prescreen: >3 drinks/day or >7 drinks/week? -   No flowsheet data found.    Reviewed orders with patient.  Reviewed health maintenance and updated orders accordingly - Yes  Patient Active Problem List   Diagnosis     Panic attacks     Depression, major     CARDIOVASCULAR SCREENING; LDL GOAL LESS THAN 160     Neck pain     Headache     Chest wall pain     Vitamin D deficiency     Sinusitis, chronic     Fatigue     Right tennis elbow     Gastroesophageal reflux disease without esophagitis     Tachycardia     Intermittent asthma, uncomplicated     Idiopathic hypersomnolence     Obesity (BMI 35.0-39.9) with comorbidity (H)     Past Surgical History:   Procedure Laterality Date      SURGICAL HISTORY OF -   7/2009    c section       Social History     Tobacco Use     Smoking status: Former Smoker     Types: Cigarettes     Last attempt to quit: 3/8/2009     Years since quitting: 10.3     Smokeless tobacco: Never Used   Substance Use Topics     Alcohol use: No     Family History   Problem Relation Age of Onset     Hypertension Mother      Depression Mother      Hyperlipidemia Mother      Asthma Father      Allergies Father      Hyperlipidemia Father      Substance Abuse Father         Alcohol     Respiratory Maternal Grandmother      Heart Disease Maternal Grandfather      Cerebrovascular Disease Maternal Grandfather      Other Cancer Maternal Grandfather         Skin     Heart Disease Paternal Grandmother      Colon Cancer Paternal Grandmother      Cerebrovascular Disease Paternal Grandfather      Breast Cancer Other      Other Cancer Other         Lung     Other Cancer Other         Lung           Mammogram not appropriate for this patient based on age.    Pertinent mammograms are reviewed under the imaging tab.  History of abnormal Pap smear: NO - age 30-65 PAP every 5 years with negative HPV co-testing recommended  PAP / HPV Latest Ref Rng & Units 3/14/2018 10/4/2012   PAP - NIL NIL   HPV 16 DNA NEG:Negative Negative -   HPV 18 DNA NEG:Negative Negative -   OTHER HR HPV NEG:Negative Negative -     Reviewed and updated as needed this visit by clinical staff  Tobacco  Allergies  Meds  Med Hx  Surg Hx  Fam Hx  Soc Hx        Reviewed and updated as needed this visit by Provider        Past Medical History:   Diagnosis Date     Allergies 08/05     Asthma, mild persistent      Depression, major      Depressive disorder      Fatigue 1/17/2014     Fatigue 1/17/2014     Fatigue      Genital warts      Headache 8/30/2010     Headache 8/30/2010     Headache      Narcolepsy      Overactive bladder      Panic attacks      Right tennis elbow 4/6/2016      Past Surgical History:   Procedure Laterality  "Date     SURGICAL HISTORY OF -   7/2009    c section       Review of Systems  CONSTITUTIONAL: NEGATIVE for fever, chills, change in weight  INTEGUMENTARU/SKIN: NEGATIVE for worrisome rashes, moles or lesions  EYES: NEGATIVE for vision changes or irritation  ENT: NEGATIVE for ear, mouth and throat problems  RESP: NEGATIVE for significant cough or SOB  BREAST: NEGATIVE for masses, tenderness or discharge  CV: NEGATIVE for chest pain, palpitations or peripheral edema  GI: NEGATIVE for nausea, abdominal pain, heartburn, or change in bowel habits  : NEGATIVE for unusual urinary or vaginal symptoms. Periods are regular.  MUSCULOSKELETAL: NEGATIVE for significant arthralgias or myalgia  NEURO: NEGATIVE for weakness, dizziness or paresthesias  PSYCHIATRIC: NEGATIVE for changes in mood or affect     OBJECTIVE:   /88 (BP Location: Right arm, Patient Position: Sitting, Cuff Size: Adult Large)   Pulse 87   Temp 98.4  F (36.9  C) (Oral)   Ht 1.638 m (5' 4.5\")   Wt 104.3 kg (230 lb)   LMP 06/17/2019 (Exact Date)   SpO2 100%   BMI 38.87 kg/m    Physical Exam  GENERAL: healthy, alert and no distress  EYES: Eyes grossly normal to inspection, PERRL and conjunctivae and sclerae normal  HENT: ear canals and TM's normal, nose and mouth without ulcers or lesions  NECK: no adenopathy, no asymmetry, masses, or scars and thyroid normal to palpation  RESP: lungs clear to auscultation - no rales, rhonchi or wheezes  BREAST: normal without masses, tenderness or nipple discharge and no palpable axillary masses or adenopathy  CV: regular rate and rhythm, normal S1 S2, no S3 or S4, no murmur, click or rub, no peripheral edema and peripheral pulses strong  ABDOMEN: soft, nontender, no hepatosplenomegaly, no masses and bowel sounds normal   (female): normal female external genitalia, normal urethral meatus, vaginal mucosa pink, moist, well rugated, and normal cervix/adnexa/uterus without masses or discharge  MS: no gross " "musculoskeletal defects noted, no edema  SKIN: no suspicious lesions or rashes  NEURO: Normal strength and tone, mentation intact and speech normal  PSYCH: mentation appears normal, affect normal/bright    ASSESSMENT/PLAN:   1. Encounter for gynecological examination without abnormal finding  Pap smear up to date.    2. Initiation of Depo Provera  Discussed options for contraception with patient including oral contraceptive pills, transdermal patches, vaginal ring, Depo Provera, Nexplanon, IUD. Not interested in permanent contraception at this time. Patient would like to start Depo Provera. Discussed injection every 3 months, possible menstrual changes and other side effcts. Discussed clinic policy for returning on time for injection. Discussed delay with return to ovulation and need for adequate calcium, weight bearing exercise. Return to clinic within first 7 days of next cycle for first injection.  - medroxyPROGESTERone (DEPO-PROVERA) injection 150 mg    COUNSELING:  Reviewed preventive health counseling, as reflected in patient instructions  Special attention given to:        Regular exercise       Healthy diet/nutrition       Contraception       HIV screeninx in teen years, 1x in adult years, and at intervals if high risk    Estimated body mass index is 38.87 kg/m  as calculated from the following:    Height as of this encounter: 1.638 m (5' 4.5\").    Weight as of this encounter: 104.3 kg (230 lb).    Weight management plan: Discussed healthy diet and exercise guidelines     reports that she quit smoking about 10 years ago. Her smoking use included cigarettes. She has never used smokeless tobacco.      Counseling Resources:  ATP IV Guidelines  Pooled Cohorts Equation Calculator  Breast Cancer Risk Calculator  FRAX Risk Assessment  ICSI Preventive Guidelines  Dietary Guidelines for Americans, 2010  USDA's MyPlate  ASA Prophylaxis  Lung CA Screening    LORENA [facility] " [place]

## 2019-07-24 ENCOUNTER — ALLIED HEALTH/NURSE VISIT (OUTPATIENT)
Dept: NURSING | Facility: CLINIC | Age: 39
End: 2019-07-24
Payer: COMMERCIAL

## 2019-07-24 VITALS — HEART RATE: 84 BPM | SYSTOLIC BLOOD PRESSURE: 138 MMHG | DIASTOLIC BLOOD PRESSURE: 80 MMHG

## 2019-07-24 DIAGNOSIS — Z30.9 CONTRACEPTIVE MANAGEMENT: Primary | ICD-10-CM

## 2019-07-24 PROCEDURE — 96372 THER/PROPH/DIAG INJ SC/IM: CPT

## 2019-07-24 PROCEDURE — 99207 ZZC NO CHARGE NURSE ONLY: CPT

## 2019-07-24 RX ADMIN — MEDROXYPROGESTERONE ACETATE 150 MG: 150 INJECTION, SUSPENSION INTRAMUSCULAR at 16:43

## 2019-07-24 NOTE — NURSING NOTE
BP: 138/80    LAST PAP/EXAM:   Lab Results   Component Value Date    PAP NIL 03/14/2018     URINE HCG:not indicated    The following medication was given:     MEDICATION: Depo Provera 150mg  ROUTE: IM  SITE: Deltoid - Left  : [employer]  LOT #: [id number]  EXP:01/2021  NEXT INJECTION DUE: 10/9/19 - 10/23/19   Provider: Kehr, Kristen

## 2019-08-01 ENCOUNTER — TELEPHONE (OUTPATIENT)
Dept: FAMILY MEDICINE | Facility: CLINIC | Age: 39
End: 2019-08-01

## 2019-08-01 NOTE — TELEPHONE ENCOUNTER
My chart message sent to patient with phq9  And salome to return to clinic      Earlene Osman MA

## 2019-09-18 DIAGNOSIS — J45.20 INTERMITTENT ASTHMA, UNCOMPLICATED: ICD-10-CM

## 2019-09-18 RX ORDER — ALBUTEROL SULFATE 90 UG/1
AEROSOL, METERED RESPIRATORY (INHALATION)
Qty: 18 G | Refills: 1 | OUTPATIENT
Start: 2019-09-18

## 2019-09-18 NOTE — TELEPHONE ENCOUNTER
"Last ov note said to follow up in Dec 2019.    Requested Prescriptions   Pending Prescriptions Disp Refills     albuterol (PROAIR HFA/PROVENTIL HFA/VENTOLIN HFA) 108 (90 Base) MCG/ACT inhaler [Pharmacy Med Name: ALBUTEROL HFA (PROAIR) INHALER] 17 Inhaler 0     Sig: INHALE 2 PUFFS BY MOUTH EVERY 4 HOURS AS NEEDED FOR SHORTNESS OF BREATH/DYSPNEA       Asthma Maintenance Inhalers - Anticholinergics Failed - 9/18/2019 10:59 AM        Failed - Asthma control assessment score within normal limits in last 6 months     Please review ACT score.           Failed - Recent (6 mo) or future (30 days) visit within the authorizing provider's specialty     Patient had office visit in the last 6 months or has a visit in the next 30 days with authorizing provider or within the authorizing provider's specialty.  See \"Patient Info\" tab in inbasket, or \"Choose Columns\" in Meds & Orders section of the refill encounter.            Passed - Patient is age 12 years or older        Passed - Medication is active on med list          "

## 2019-10-23 ENCOUNTER — ALLIED HEALTH/NURSE VISIT (OUTPATIENT)
Dept: FAMILY MEDICINE | Facility: CLINIC | Age: 39
End: 2019-10-23
Payer: COMMERCIAL

## 2019-10-23 DIAGNOSIS — Z30.42 ENCOUNTER FOR SURVEILLANCE OF INJECTABLE CONTRACEPTIVE: Primary | ICD-10-CM

## 2019-10-23 PROCEDURE — 96372 THER/PROPH/DIAG INJ SC/IM: CPT

## 2019-10-23 PROCEDURE — 99207 ZZC NO CHARGE NURSE ONLY: CPT

## 2019-10-23 RX ADMIN — MEDROXYPROGESTERONE ACETATE 150 MG: 150 INJECTION, SUSPENSION INTRAMUSCULAR at 11:44

## 2019-10-23 NOTE — PROGRESS NOTES
Clinic Administered Medication Documentation    MEDICATION LIST:   Depo Provera Documentation    Prior to injection, verified patient identity using patient's name and date of birth. Medication was administered. Please see MAR and medication order for additional information. Patient instructed to remain in clinic for 15 minutes and report any adverse reaction to staff immediately .    BP: Data Unavailable    LAST PAP/EXAM:   Lab Results   Component Value Date    PAP NIL 03/14/2018     URINE HCG:not indicated    NEXT INJECTION DUE: 1/8/20 - 1/22/20    Was entire vial of medication used? Yes  Vial/Syringe: Single dose vial  Expiration Date:  06/2020

## 2019-11-05 ENCOUNTER — HEALTH MAINTENANCE LETTER (OUTPATIENT)
Age: 39
End: 2019-11-05

## 2020-01-16 ENCOUNTER — ALLIED HEALTH/NURSE VISIT (OUTPATIENT)
Dept: FAMILY MEDICINE | Facility: CLINIC | Age: 40
End: 2020-01-16
Payer: COMMERCIAL

## 2020-01-16 DIAGNOSIS — Z30.42 ENCOUNTER FOR SURVEILLANCE OF INJECTABLE CONTRACEPTIVE: Primary | ICD-10-CM

## 2020-01-16 PROCEDURE — 99207 ZZC NO CHARGE NURSE ONLY: CPT

## 2020-01-16 PROCEDURE — 96372 THER/PROPH/DIAG INJ SC/IM: CPT

## 2020-01-16 RX ADMIN — MEDROXYPROGESTERONE ACETATE 150 MG: 150 INJECTION, SUSPENSION INTRAMUSCULAR at 12:00

## 2020-01-16 NOTE — PROGRESS NOTES
Clinic Administered Medication Documentation    MEDICATION LIST:   Depo Provera Documentation    Prior to injection, verified patient identity using patient's name and date of birth. Medication was administered. Please see MAR and medication order for additional information. Patient instructed to remain in clinic for 15 minutes.    BP: Data Unavailable    LAST PAP/EXAM:   Lab Results   Component Value Date    PAP NIL 03/14/2018     URINE HCG:not indicated    NEXT INJECTION DUE: 4/2/20 - 4/16/20    Was entire vial of medication used? Yes  Vial/Syringe: Single dose vial  Expiration Date:  07/01/2020  Left MIGUEL Mark CMA

## 2020-01-17 ENCOUNTER — TELEPHONE (OUTPATIENT)
Dept: FAMILY MEDICINE | Facility: CLINIC | Age: 40
End: 2020-01-17

## 2020-01-17 NOTE — TELEPHONE ENCOUNTER
My chart message along woth PHQ9 and MAIA for patient to complete and return.    PHQ-9 due now for patient   Index date:       12/03/2018  Index PHQ9 :   13  FU start date:   10/04/2019  FU End date :   2/01/2020    Pat Sainz, [facility]

## 2020-01-19 ENCOUNTER — MYC REFILL (OUTPATIENT)
Dept: FAMILY MEDICINE | Facility: CLINIC | Age: 40
End: 2020-01-19

## 2020-01-19 ENCOUNTER — MYC MEDICAL ADVICE (OUTPATIENT)
Dept: FAMILY MEDICINE | Facility: CLINIC | Age: 40
End: 2020-01-19

## 2020-01-19 DIAGNOSIS — J45.20 INTERMITTENT ASTHMA, UNCOMPLICATED: ICD-10-CM

## 2020-01-20 RX ORDER — ALBUTEROL SULFATE 90 UG/1
AEROSOL, METERED RESPIRATORY (INHALATION)
Qty: 17 G | Refills: 0 | Status: SHIPPED | OUTPATIENT
Start: 2020-01-20 | End: 2020-06-30

## 2020-01-20 NOTE — TELEPHONE ENCOUNTER
Patient returned PHQ-9 with score:    PHQ-9 SCORE 7/14/2017 12/3/2018 1/19/2020   PHQ-9 Total Score - - -   PHQ-9 Total Score MyChart - - 0   PHQ-9 Total Score 3 13 0       Routing to PCP as FYI or further advisement.

## 2020-06-25 DIAGNOSIS — J45.20 INTERMITTENT ASTHMA, UNCOMPLICATED: ICD-10-CM

## 2020-06-29 ENCOUNTER — E-VISIT (OUTPATIENT)
Dept: FAMILY MEDICINE | Facility: CLINIC | Age: 40
End: 2020-06-29
Payer: COMMERCIAL

## 2020-06-29 DIAGNOSIS — J45.20 INTERMITTENT ASTHMA, UNCOMPLICATED: ICD-10-CM

## 2020-06-29 PROCEDURE — 99421 OL DIG E/M SVC 5-10 MIN: CPT | Performed by: FAMILY MEDICINE

## 2020-06-29 RX ORDER — ALBUTEROL SULFATE 90 UG/1
AEROSOL, METERED RESPIRATORY (INHALATION)
Qty: 18 INHALER | Refills: 1 | OUTPATIENT
Start: 2020-06-29

## 2020-06-29 NOTE — TELEPHONE ENCOUNTER
"Albuterol inhaler refill request  Last OV: 12/3/18 Dr. Zoë Castillo for her asthma    Requested Prescriptions   Pending Prescriptions Disp Refills     albuterol (VENTOLIN HFA) 108 (90 Base) MCG/ACT inhaler [Pharmacy Med Name: VENTOLIN HFA 90 MCG INHALER] 18 Inhaler 1     Sig: INHALE 2 PUFFS BY MOUTH EVERY 4 HOURS AS NEEDED FOR SHORTNESS OF BREATH/DYSPNEA       Asthma Maintenance Inhalers - Anticholinergics Failed - 6/25/2020  8:48 AM        Failed - Asthma control assessment score within normal limits in last 6 months     Please review ACT score.   ACT Total Scores 9/16/2016 3/22/2017 12/3/2018   ACT TOTAL SCORE - - -   ASTHMA ER VISITS - - -   ASTHMA HOSPITALIZATIONS - - -   ACT TOTAL SCORE (Goal Greater than or Equal to 20) 23 23 23   In the past 12 months, how many times did you visit the emergency room for your asthma without being admitted to the hospital? 0 0 0   In the past 12 months, how many times were you hospitalized overnight because of your asthma? 0 0 0           Failed - Recent (6 mo) or future (30 days) visit within the authorizing provider's specialty     Patient had office visit in the last 6 months or has a visit in the next 30 days with authorizing provider or within the authorizing provider's specialty.  See \"Patient Info\" tab in [employer], or \"Choose Columns\" in Meds & Orders section of the refill encounter.            Passed - Patient is age 12 years or older        Passed - Medication is active on med list       Short-Acting Beta Agonist Inhalers Protocol  Failed - 6/25/2020  8:48 AM        Failed - Asthma control assessment score within normal limits in last 6 months     Please review ACT score.           Failed - Recent (6 mo) or future (30 days) visit within the authorizing provider's specialty     Patient had office visit in the last 6 months or has a visit in the next 30 days with authorizing provider or within the authorizing provider's specialty.  See \"Patient Info\" tab in inbasket, or " "\"Choose Columns\" in Meds & Orders section of the refill encounter.            Passed - Patient is age 12 or older        Passed - Medication is active on med list           "

## 2020-06-30 RX ORDER — ALBUTEROL SULFATE 90 UG/1
AEROSOL, METERED RESPIRATORY (INHALATION)
Qty: 17 G | Refills: 1 | Status: SHIPPED | OUTPATIENT
Start: 2020-06-30 | End: 2021-09-08

## 2020-08-03 NOTE — PROGRESS NOTES
Subjective     Francesca Moura is a 39 year old female who presents to clinic today for the following health issues:    HPI       Birth control counseling    Patient had been on Depo Provera for contraception as well as to manage heavy and painful cycles. Last injection was in January, was next due for injection as COVID hit and did not want to be in clinic so stopped coming. Cycles resumed fairly quickly after that. They are regular and has 1 heavy day and then lightens up significantly. Cramping is becoming very bothersome and disruptive to her. Also is needing contraception. Considering combined oral contraceptive pill, likely does not want to restart Depo Provera.    Patient Active Problem List   Diagnosis     Panic attacks     Depression, major     CARDIOVASCULAR SCREENING; LDL GOAL LESS THAN 160     Neck pain     Headache     Vitamin D deficiency     Sinusitis, chronic     Fatigue     Right tennis elbow     Gastroesophageal reflux disease without esophagitis     Tachycardia     Intermittent asthma, uncomplicated     Idiopathic hypersomnolence     Obesity (BMI 35.0-39.9) with comorbidity (H)     Past Surgical History:   Procedure Laterality Date     SURGICAL HISTORY OF -   2009    c section       Social History     Tobacco Use     Smoking status: Former Smoker     Types: Cigarettes     Last attempt to quit: 3/8/2009     Years since quittin.4     Smokeless tobacco: Never Used   Substance Use Topics     Alcohol use: No     Family History   Problem Relation Age of Onset     Hypertension Mother      Depression Mother      Hyperlipidemia Mother      Asthma Father      Allergies Father      Hyperlipidemia Father      Substance Abuse Father         Alcohol     Respiratory Maternal Grandmother      Heart Disease Maternal Grandfather      Cerebrovascular Disease Maternal Grandfather      Other Cancer Maternal Grandfather         Skin     Heart Disease Paternal Grandmother      Colon Cancer Paternal Grandmother   "    Cerebrovascular Disease Paternal Grandfather      Breast Cancer Other      Other Cancer Other         Lung     Other Cancer Other         Lung           Reviewed and updated as needed this visit by Provider         Review of Systems   Constitutional, HEENT, cardiovascular, pulmonary, gi and gu systems are negative, except as otherwise noted.      Objective    BP (!) 141/87 (BP Location: Right arm, Patient Position: Sitting, Cuff Size: Adult Large)   Pulse 87   Temp 96.4  F (35.8  C) (Tympanic)   Ht 1.651 m (5' 5\")   Wt 105.2 kg (232 lb)   LMP 07/15/2020 (Approximate)   SpO2 98%   BMI 38.61 kg/m    Body mass index is 38.61 kg/m .  Physical Exam   GENERAL: healthy, alert and no distress  MS: no gross musculoskeletal defects noted, no edema  SKIN: no suspicious lesions or rashes  PSYCH: mentation appears normal, affect normal/bright    Assessment & Plan     1. Dysmenorrhea  See below  - norethindrone (AYGESTIN) 5 MG tablet; Take 1 tablet (5 mg) by mouth daily  Dispense: 90 tablet; Refill: 3    2. Excessive and frequent menstruation  Discussed options for cycle management with patient. Initially considering combined oral contraceptive pill, but would like to try continuous oral progesterone first to see if we can suppress her bleeding and cramping. We discussed when to start the pill, taking it at the same time every day, possible side effects she may experience. Discussed breakthrough bleeding risk if she is late taking the pills. If she has persistent bleeding, can dose adjustment. Call if she decides she would like to change to combined oral contraceptive pill.   - norethindrone (AYGESTIN) 5 MG tablet; Take 1 tablet (5 mg) by mouth daily  Dispense: 90 tablet; Refill: 3     LORENA Messina [facility]  "

## 2020-08-04 ENCOUNTER — OFFICE VISIT (OUTPATIENT)
Dept: OBGYN | Facility: CLINIC | Age: 40
End: 2020-08-04
Payer: COMMERCIAL

## 2020-08-04 VITALS
WEIGHT: 232 LBS | BODY MASS INDEX: 38.65 KG/M2 | SYSTOLIC BLOOD PRESSURE: 141 MMHG | DIASTOLIC BLOOD PRESSURE: 87 MMHG | HEART RATE: 87 BPM | OXYGEN SATURATION: 98 % | HEIGHT: 65 IN | TEMPERATURE: 96.4 F

## 2020-08-04 DIAGNOSIS — N92.0 EXCESSIVE AND FREQUENT MENSTRUATION: ICD-10-CM

## 2020-08-04 DIAGNOSIS — N94.6 DYSMENORRHEA: Primary | ICD-10-CM

## 2020-08-04 PROCEDURE — 99213 OFFICE O/P EST LOW 20 MIN: CPT | Performed by: NURSE PRACTITIONER

## 2020-08-04 RX ORDER — NORETHINDRONE ACETATE 5 MG
5 TABLET ORAL DAILY
Qty: 90 TABLET | Refills: 3 | Status: SHIPPED | OUTPATIENT
Start: 2020-08-04 | End: 2020-10-19

## 2020-08-04 ASSESSMENT — PAIN SCALES - GENERAL: PAINLEVEL: NO PAIN (0)

## 2020-08-04 ASSESSMENT — MIFFLIN-ST. JEOR: SCORE: 1733.23

## 2020-08-26 ENCOUNTER — E-VISIT (OUTPATIENT)
Dept: FAMILY MEDICINE | Facility: CLINIC | Age: 40
End: 2020-08-26
Payer: COMMERCIAL

## 2020-08-26 ENCOUNTER — MYC MEDICAL ADVICE (OUTPATIENT)
Dept: FAMILY MEDICINE | Facility: CLINIC | Age: 40
End: 2020-08-26

## 2020-08-26 DIAGNOSIS — F41.0 PANIC ATTACKS: Primary | ICD-10-CM

## 2020-08-26 DIAGNOSIS — F32.9 CURRENT EPISODE OF MAJOR DEPRESSIVE DISORDER WITHOUT PRIOR EPISODE, UNSPECIFIED DEPRESSION EPISODE SEVERITY: ICD-10-CM

## 2020-08-26 PROCEDURE — 99422 OL DIG E/M SVC 11-20 MIN: CPT | Performed by: FAMILY MEDICINE

## 2020-08-26 RX ORDER — ESCITALOPRAM OXALATE 10 MG/1
TABLET ORAL
Qty: 30 TABLET | Refills: 1 | Status: SHIPPED | OUTPATIENT
Start: 2020-08-26 | End: 2020-11-04

## 2020-08-26 RX ORDER — ALPRAZOLAM 0.25 MG
0.25 TABLET ORAL 3 TIMES DAILY PRN
Qty: 10 TABLET | Refills: 0 | Status: SHIPPED | OUTPATIENT
Start: 2020-08-26 | End: 2022-02-09

## 2020-08-26 ASSESSMENT — PATIENT HEALTH QUESTIONNAIRE - PHQ9
10. IF YOU CHECKED OFF ANY PROBLEMS, HOW DIFFICULT HAVE THESE PROBLEMS MADE IT FOR YOU TO DO YOUR WORK, TAKE CARE OF THINGS AT HOME, OR GET ALONG WITH OTHER PEOPLE: SOMEWHAT DIFFICULT
SUM OF ALL RESPONSES TO PHQ QUESTIONS 1-9: 10
SUM OF ALL RESPONSES TO PHQ QUESTIONS 1-9: 10

## 2020-08-26 ASSESSMENT — ANXIETY QUESTIONNAIRES
2. NOT BEING ABLE TO STOP OR CONTROL WORRYING: MORE THAN HALF THE DAYS
3. WORRYING TOO MUCH ABOUT DIFFERENT THINGS: MORE THAN HALF THE DAYS
7. FEELING AFRAID AS IF SOMETHING AWFUL MIGHT HAPPEN: NEARLY EVERY DAY
GAD7 TOTAL SCORE: 17
1. FEELING NERVOUS, ANXIOUS, OR ON EDGE: NEARLY EVERY DAY
5. BEING SO RESTLESS THAT IT IS HARD TO SIT STILL: SEVERAL DAYS
6. BECOMING EASILY ANNOYED OR IRRITABLE: NEARLY EVERY DAY
GAD7 TOTAL SCORE: 17
7. FEELING AFRAID AS IF SOMETHING AWFUL MIGHT HAPPEN: NEARLY EVERY DAY
GAD7 TOTAL SCORE: 17
4. TROUBLE RELAXING: NEARLY EVERY DAY

## 2020-08-27 ASSESSMENT — ANXIETY QUESTIONNAIRES: GAD7 TOTAL SCORE: 17

## 2020-08-27 ASSESSMENT — PATIENT HEALTH QUESTIONNAIRE - PHQ9: SUM OF ALL RESPONSES TO PHQ QUESTIONS 1-9: 10

## 2020-10-19 ENCOUNTER — PRENATAL OFFICE VISIT (OUTPATIENT)
Dept: OBGYN | Facility: CLINIC | Age: 40
End: 2020-10-19
Payer: COMMERCIAL

## 2020-10-19 VITALS
WEIGHT: 237 LBS | OXYGEN SATURATION: 100 % | HEART RATE: 72 BPM | BODY MASS INDEX: 39.49 KG/M2 | HEIGHT: 65 IN | SYSTOLIC BLOOD PRESSURE: 122 MMHG | DIASTOLIC BLOOD PRESSURE: 80 MMHG

## 2020-10-19 DIAGNOSIS — O34.219 PREVIOUS CESAREAN SECTION COMPLICATING PREGNANCY: Primary | ICD-10-CM

## 2020-10-19 DIAGNOSIS — O09.529 ANTEPARTUM MULTIGRAVIDA OF ADVANCED MATERNAL AGE: ICD-10-CM

## 2020-10-19 LAB
ABO + RH BLD: NORMAL
ABO + RH BLD: NORMAL
BASOPHILS # BLD AUTO: 0 10E9/L (ref 0–0.2)
BASOPHILS NFR BLD AUTO: 0.3 %
BLD GP AB SCN SERPL QL: NORMAL
BLOOD BANK CMNT PATIENT-IMP: NORMAL
DIFFERENTIAL METHOD BLD: ABNORMAL
EOSINOPHIL # BLD AUTO: 0.3 10E9/L (ref 0–0.7)
EOSINOPHIL NFR BLD AUTO: 2.2 %
ERYTHROCYTE [DISTWIDTH] IN BLOOD BY AUTOMATED COUNT: 13.9 % (ref 10–15)
HCG UR QL: POSITIVE
HCT VFR BLD AUTO: 42.4 % (ref 35–47)
HGB BLD-MCNC: 13.9 G/DL (ref 11.7–15.7)
LYMPHOCYTES # BLD AUTO: 2 10E9/L (ref 0.8–5.3)
LYMPHOCYTES NFR BLD AUTO: 17 %
MCH RBC QN AUTO: 29.2 PG (ref 26.5–33)
MCHC RBC AUTO-ENTMCNC: 32.8 G/DL (ref 31.5–36.5)
MCV RBC AUTO: 89 FL (ref 78–100)
MONOCYTES # BLD AUTO: 0.5 10E9/L (ref 0–1.3)
MONOCYTES NFR BLD AUTO: 4.2 %
NEUTROPHILS # BLD AUTO: 9 10E9/L (ref 1.6–8.3)
NEUTROPHILS NFR BLD AUTO: 76.3 %
PLATELET # BLD AUTO: 263 10E9/L (ref 150–450)
RBC # BLD AUTO: 4.76 10E12/L (ref 3.8–5.2)
SPECIMEN EXP DATE BLD: NORMAL
WBC # BLD AUTO: 11.8 10E9/L (ref 4–11)

## 2020-10-19 PROCEDURE — 86762 RUBELLA ANTIBODY: CPT | Performed by: NURSE PRACTITIONER

## 2020-10-19 PROCEDURE — 81025 URINE PREGNANCY TEST: CPT | Performed by: NURSE PRACTITIONER

## 2020-10-19 PROCEDURE — 85025 COMPLETE CBC W/AUTO DIFF WBC: CPT | Performed by: NURSE PRACTITIONER

## 2020-10-19 PROCEDURE — 86780 TREPONEMA PALLIDUM: CPT | Mod: 90 | Performed by: NURSE PRACTITIONER

## 2020-10-19 PROCEDURE — 99213 OFFICE O/P EST LOW 20 MIN: CPT | Performed by: NURSE PRACTITIONER

## 2020-10-19 PROCEDURE — 87086 URINE CULTURE/COLONY COUNT: CPT | Performed by: NURSE PRACTITIONER

## 2020-10-19 PROCEDURE — 86850 RBC ANTIBODY SCREEN: CPT | Performed by: NURSE PRACTITIONER

## 2020-10-19 PROCEDURE — 87389 HIV-1 AG W/HIV-1&-2 AB AG IA: CPT | Performed by: NURSE PRACTITIONER

## 2020-10-19 PROCEDURE — 86900 BLOOD TYPING SEROLOGIC ABO: CPT | Performed by: NURSE PRACTITIONER

## 2020-10-19 PROCEDURE — 36415 COLL VENOUS BLD VENIPUNCTURE: CPT | Performed by: NURSE PRACTITIONER

## 2020-10-19 PROCEDURE — 87340 HEPATITIS B SURFACE AG IA: CPT | Performed by: NURSE PRACTITIONER

## 2020-10-19 PROCEDURE — 86901 BLOOD TYPING SEROLOGIC RH(D): CPT | Performed by: NURSE PRACTITIONER

## 2020-10-19 PROCEDURE — 99000 SPECIMEN HANDLING OFFICE-LAB: CPT | Performed by: NURSE PRACTITIONER

## 2020-10-19 ASSESSMENT — MIFFLIN-ST. JEOR: SCORE: 1750.9

## 2020-10-19 NOTE — PROGRESS NOTES
Francesca is a 40 year old  @ unknown gestational age here for new OB visit.    Patient received her last dose of Depo Provera in January. Was seen early August to discuss management of menstrual cycles and contraception. Decided to try continuous oral progesterone. Had a very light bleed the day after her visit, but was not a normal cycle, so never started the progesterone, deciding to wait for a normal cycle. Patient suspects she is 10-13 weeks gestation at this time. Did not have any pregnancy symptoms until some mild nausea about 10 days ago.    Undecided on repeat  section vs .  Has been taking Tramadol BID and Adderrall.    See OB questionnaire for pertinent components of HPI.    OBhx: C-sec x 1  AB x 1    Past Medical History:   Diagnosis Date     Allergies      Asthma, mild persistent      Depression, major      Depressive disorder      Fatigue 2014     Fatigue 2014     Fatigue      Genital warts      Headache 2010     Headache 2010     Headache      Narcolepsy      Overactive bladder      Panic attacks      Right tennis elbow 2016     Past Surgical History:   Procedure Laterality Date     SURGICAL HISTORY OF -   2009    c section     Patient Active Problem List    Diagnosis Date Noted     Previous  section complicating pregnancy 10/19/2020     Priority: Medium     Obesity (BMI 35.0-39.9) with comorbidity (H) 2018     Priority: Medium     Idiopathic hypersomnolence 2016     Priority: Medium     Seen by sleep clinic        Intermittent asthma, uncomplicated 2016     Priority: Medium     Gastroesophageal reflux disease without esophagitis 2016     Priority: Medium     Tachycardia 2016     Priority: Medium     Right tennis elbow 2016     Priority: Medium     Fatigue 2014     Priority: Medium     Sinusitis, chronic 2013     Priority: Medium     Problem list name updated by automated process. Provider to  review       Vitamin D deficiency 10/08/2012     Priority: Medium     Neck pain 08/30/2010     Priority: Medium     Headache 08/30/2010     Priority: Medium     Problem list name updated by automated process. Provider to review       CARDIOVASCULAR SCREENING; LDL GOAL LESS THAN 160 06/11/2010     Priority: Medium     Panic attacks      Priority: Medium     Depression, major      Priority: Medium        Allergies   Allergen Reactions     Bactrim Hives     Cephalexin Hives     Penicillins Hives     Vancomycin Hcl Hives     PERSIST EVEN DAY AFTER STOPPING MEDICATION.  leucopenia     Current Outpatient Medications   Medication Sig Dispense Refill     amphetamine-dextroamphetamine (ADDERALL) 10 MG per tablet Take 1 tablet (10 mg) by mouth 2 times daily Take a third tablet if needed 75 tablet 0     VITAMIN D, CHOLECALCIFEROL, PO Take 1,000 Units by mouth daily       albuterol (PROAIR HFA) 108 (90 Base) MCG/ACT inhaler INHALE 2 PUFFS BY MOUTH EVERY 4 HOURS AS NEEDED FOR SHORTNESS OF BREATH/DYSPNEA (Patient not taking: Reported on 10/19/2020) 17 g 1     ALPRAZolam (XANAX) 0.25 MG tablet Take 1 tablet (0.25 mg) by mouth 3 times daily as needed for anxiety (Patient not taking: Reported on 10/19/2020) 10 tablet 0     escitalopram (LEXAPRO) 10 MG tablet Take 1/2 tablet po every day for 1-2 weeks, then increase to one full tablet daily (Patient not taking: Reported on 10/19/2020) 30 tablet 1     traMADol (ULTRAM) 50 MG tablet Take 1 tablet (50 mg) by mouth every 6 hours as needed for moderate pain 60 tablet 0       Review of Systems:     CONSTITUTIONAL:NEGATIVE for fever, chills, change in weight  INTEGUMENTARY/SKIN: NEGATIVE for worrisome rashes, moles or lesions  EYES: NEGATIVE for vision changes or irritation  ENT/MOUTH: NEGATIVE for ear, mouth and throat problems  RESP:NEGATIVE for significant cough or SOB  BREAST: NEGATIVE for masses, tenderness or discharge  CV: NEGATIVE for chest pain, palpitations or peripheral  "edema  GI: NEGATIVE for abdominal pain, heartburn, or change in bowel habits and POSITIVE for gas or bloating and nausea  :  Denies current vaginal bleeding, abnormal vaginal discharge  NEURO: NEGATIVE for weakness, dizziness or paresthesias  HEME/ALLERGY/IMMUNE: NEGATIVE for bleeding problems  PSYCHIATRIC: NEGATIVE for changes in mood or affect      Past Medical History of Father of Baby: No significant medical history  History Since Last Menstrual Period: No Problems    Physical Exam: /80 (BP Location: Right arm, Patient Position: Chair, Cuff Size: Adult Large)   Pulse 72   Ht 1.651 m (5' 5\")   Wt 107.5 kg (237 lb)   LMP 2020   SpO2 100%   Breastfeeding No   BMI 39.44 kg/m    General: Well developed, well nourished female  Skin: Normal  HEENT: Normal  Neck: Supple,no adenopathy,thyroid normal  Chest: Clear to auscultation  Heart: Regular rate, rhythm,No murmur, rub, gallop  Abdomen: Benign and No masses, organomegaly    Extremities: Normal  Neurological: Normal      A/P 40 year old  at unknown gestational age  Discussed physician coverage, tertiary support, diet, exercise, weight gain, schedule of visits, routine and indicated ultrasounds, and childbirth education.  Prenatal labs: Prenatal Panel Urine Culture.  To start taking prenatal vitamins  Discussed maternal quad screening between 15-20 weeks and reviewed benefits and limitations.  Ultrasound for dating.  Would like to do first trimester screening with MFM, encouraged to complete ultrasound as soon as she can so we can be sure to get her in with MFM in a timely manner if she desires NT ultrasound.  Discussed prenatal visit schedule, level 2 ultrasound, delivery providers and hospital.  We discussed her Tramadol and Adderall use, patient will follow up with her prescribing providers for this.   Screening for gonorrhea and chlamydia not done today due to shortage of test supplies and patient is low risk.     Lindsay CARRILLO " CNP

## 2020-10-20 ENCOUNTER — ANCILLARY PROCEDURE (OUTPATIENT)
Dept: ULTRASOUND IMAGING | Facility: CLINIC | Age: 40
End: 2020-10-20
Attending: NURSE PRACTITIONER
Payer: COMMERCIAL

## 2020-10-20 LAB
BACTERIA SPEC CULT: NORMAL
HBV SURFACE AG SERPL QL IA: NONREACTIVE
HIV 1+2 AB+HIV1 P24 AG SERPL QL IA: NONREACTIVE
Lab: NORMAL
RUBV IGG SERPL IA-ACNC: 27 IU/ML
SPECIMEN SOURCE: NORMAL
T PALLIDUM AB SER QL: NONREACTIVE

## 2020-10-21 ENCOUNTER — TELEPHONE (OUTPATIENT)
Dept: OBGYN | Facility: CLINIC | Age: 40
End: 2020-10-21
Payer: COMMERCIAL

## 2020-10-21 DIAGNOSIS — Z53.9 ERRONEOUS ENCOUNTER--DISREGARD: Primary | ICD-10-CM

## 2020-11-02 DIAGNOSIS — F32.9 CURRENT EPISODE OF MAJOR DEPRESSIVE DISORDER WITHOUT PRIOR EPISODE, UNSPECIFIED DEPRESSION EPISODE SEVERITY: ICD-10-CM

## 2020-11-02 DIAGNOSIS — F41.0 PANIC ATTACKS: ICD-10-CM

## 2020-11-04 RX ORDER — ESCITALOPRAM OXALATE 10 MG/1
TABLET ORAL
Qty: 30 TABLET | Refills: 1 | OUTPATIENT
Start: 2020-11-04

## 2020-11-22 ENCOUNTER — HEALTH MAINTENANCE LETTER (OUTPATIENT)
Age: 40
End: 2020-11-22

## 2021-06-18 ENCOUNTER — MEDICAL CORRESPONDENCE (OUTPATIENT)
Dept: HEALTH INFORMATION MANAGEMENT | Facility: CLINIC | Age: 41
End: 2021-06-18

## 2021-08-10 ENCOUNTER — TELEPHONE (OUTPATIENT)
Dept: FAMILY MEDICINE | Facility: CLINIC | Age: 41
End: 2021-08-10

## 2021-08-10 NOTE — TELEPHONE ENCOUNTER
Patient Quality Outreach      Summary:    Patient has the following on her problem list/HM:     Depression / Dysthymia review    6 Month Remission: 4-8 month window range:     12 Month Remission: 10-14 month window range:        PHQ-9 SCORE 12/3/2018 1/19/2020 8/26/2020   PHQ-9 Total Score - - -   PHQ-9 Total Score MyChart - 0 10 (Moderate depression)   PHQ-9 Total Score 13 0 10       If PHQ-9 recheck is 5 or more, route to provider for next steps.    Asthma review       ACT Total Scores 12/3/2018   ACT TOTAL SCORE -   ASTHMA ER VISITS -   ASTHMA HOSPITALIZATIONS -   ACT TOTAL SCORE (Goal Greater than or Equal to 20) 23   In the past 12 months, how many times did you visit the emergency room for your asthma without being admitted to the hospital? 0   In the past 12 months, how many times were you hospitalized overnight because of your asthma? 0          Patient is due/failing the following:   ACT needed and AAP and PHQ-9 Needed and Depression follow-up visit    Type of outreach:    Sent [employer] message.    Questions for provider review:    None                                                                                                                                     Earlene Osman MA       Chart routed to Care Team.

## 2021-09-07 DIAGNOSIS — J45.20 INTERMITTENT ASTHMA, UNCOMPLICATED: ICD-10-CM

## 2021-09-08 RX ORDER — ALBUTEROL SULFATE 90 UG/1
AEROSOL, METERED RESPIRATORY (INHALATION)
Qty: 18 G | Refills: 0 | Status: SHIPPED | OUTPATIENT
Start: 2021-09-08 | End: 2021-12-05

## 2021-09-08 NOTE — TELEPHONE ENCOUNTER
Routing refill request to provider for review/approval because:  Patient needs to be seen because:  Asthma  ACT Total Scores 9/16/2016 3/22/2017 12/3/2018   ACT TOTAL SCORE - - -   ASTHMA ER VISITS - - -   ASTHMA HOSPITALIZATIONS - - -   ACT TOTAL SCORE (Goal Greater than or Equal to 20) 23 23 23   In the past 12 months, how many times did you visit the emergency room for your asthma without being admitted to the hospital? 0 0 0   In the past 12 months, how many times were you hospitalized overnight because of your asthma? 0 0 0     Catarina Choudhury RN

## 2021-09-19 ENCOUNTER — HEALTH MAINTENANCE LETTER (OUTPATIENT)
Age: 41
End: 2021-09-19

## 2021-12-04 DIAGNOSIS — J45.20 INTERMITTENT ASTHMA, UNCOMPLICATED: ICD-10-CM

## 2021-12-05 RX ORDER — ALBUTEROL SULFATE 90 UG/1
AEROSOL, METERED RESPIRATORY (INHALATION)
Qty: 18 G | Refills: 0 | Status: SHIPPED | OUTPATIENT
Start: 2021-12-05 | End: 2022-09-08

## 2022-01-09 ENCOUNTER — HEALTH MAINTENANCE LETTER (OUTPATIENT)
Age: 42
End: 2022-01-09

## 2022-02-09 DIAGNOSIS — J45.20 INTERMITTENT ASTHMA, UNCOMPLICATED: ICD-10-CM

## 2022-02-09 RX ORDER — ALBUTEROL SULFATE 90 UG/1
AEROSOL, METERED RESPIRATORY (INHALATION)
Qty: 18 G | OUTPATIENT
Start: 2022-02-09

## 2022-02-09 NOTE — TELEPHONE ENCOUNTER
"Requested Prescriptions   Pending Prescriptions Disp Refills    albuterol (PROAIR HFA/PROVENTIL HFA/VENTOLIN HFA) 108 (90 Base) MCG/ACT inhaler [Pharmacy Med Name: ALBUTEROL HFA (PROAIR) INHALER] 18 g      Sig: INHALE 2 PUFFS BY MOUTH EVERY 4 HOURS AS NEEDED FOR SHORTNESS OF BREATH/DYSPNEA, NEED APPOINTMENT        Asthma Maintenance Inhalers - Anticholinergics Failed - 2/9/2022 10:55 AM        Failed - Asthma control assessment score within normal limits in last 6 months     Please review ACT score.           Failed - Recent (6 mo) or future (30 days) visit within the authorizing provider's specialty     Patient had office visit in the last 6 months or has a visit in the next 30 days with authorizing provider or within the authorizing provider's specialty.  See \"Patient Info\" tab in inbasket, or \"Choose Columns\" in Meds & Orders section of the refill encounter.            Passed - Patient is age 12 years or older        Passed - Medication is active on med list       Short-Acting Beta Agonist Inhalers Protocol  Failed - 2/9/2022 10:55 AM        Failed - Asthma control assessment score within normal limits in last 6 months     Please review ACT score.           Failed - Recent (6 mo) or future (30 days) visit within the authorizing provider's specialty     Patient had office visit in the last 6 months or has a visit in the next 30 days with authorizing provider or within the authorizing provider's specialty.  See \"Patient Info\" tab in inbasket, or \"Choose Columns\" in Meds & Orders section of the refill encounter.            Passed - Patient is age 12 or older        Passed - Medication is active on med list              "

## 2022-09-08 ENCOUNTER — OFFICE VISIT (OUTPATIENT)
Dept: FAMILY MEDICINE | Facility: CLINIC | Age: 42
End: 2022-09-08
Payer: COMMERCIAL

## 2022-09-08 VITALS
SYSTOLIC BLOOD PRESSURE: 138 MMHG | DIASTOLIC BLOOD PRESSURE: 85 MMHG | WEIGHT: 230 LBS | HEART RATE: 100 BPM | RESPIRATION RATE: 18 BRPM | OXYGEN SATURATION: 96 % | BODY MASS INDEX: 38.32 KG/M2 | HEIGHT: 65 IN | TEMPERATURE: 97.7 F

## 2022-09-08 DIAGNOSIS — Z12.83 SKIN EXAM, SCREENING FOR CANCER: ICD-10-CM

## 2022-09-08 DIAGNOSIS — F17.200 NICOTINE DEPENDENCE, UNCOMPLICATED, UNSPECIFIED NICOTINE PRODUCT TYPE: ICD-10-CM

## 2022-09-08 DIAGNOSIS — L82.1 SEBORRHEIC KERATOSES: ICD-10-CM

## 2022-09-08 DIAGNOSIS — J45.20 INTERMITTENT ASTHMA, UNCOMPLICATED: Primary | ICD-10-CM

## 2022-09-08 PROCEDURE — 99203 OFFICE O/P NEW LOW 30 MIN: CPT | Performed by: FAMILY MEDICINE

## 2022-09-08 PROCEDURE — 96127 BRIEF EMOTIONAL/BEHAV ASSMT: CPT | Performed by: FAMILY MEDICINE

## 2022-09-08 RX ORDER — BUPROPION HYDROCHLORIDE 150 MG/1
TABLET, FILM COATED, EXTENDED RELEASE ORAL
Qty: 57 TABLET | Refills: 0 | Status: SHIPPED | OUTPATIENT
Start: 2022-09-08 | End: 2022-10-08

## 2022-09-08 RX ORDER — ALBUTEROL SULFATE 90 UG/1
2 AEROSOL, METERED RESPIRATORY (INHALATION) EVERY 6 HOURS PRN
Qty: 18 G | Refills: 4 | Status: SHIPPED | OUTPATIENT
Start: 2022-09-08 | End: 2023-12-28

## 2022-09-08 RX ORDER — BUPROPION HYDROCHLORIDE 150 MG/1
150 TABLET, FILM COATED, EXTENDED RELEASE ORAL 2 TIMES DAILY
Qty: 60 TABLET | Refills: 2 | Status: SHIPPED | OUTPATIENT
Start: 2022-10-08

## 2022-09-08 ASSESSMENT — ASTHMA QUESTIONNAIRES
QUESTION_5 LAST FOUR WEEKS HOW WOULD YOU RATE YOUR ASTHMA CONTROL: WELL CONTROLLED
QUESTION_2 LAST FOUR WEEKS HOW OFTEN HAVE YOU HAD SHORTNESS OF BREATH: ONCE OR TWICE A WEEK
ACT_TOTALSCORE: 21
QUESTION_4 LAST FOUR WEEKS HOW OFTEN HAVE YOU USED YOUR RESCUE INHALER OR NEBULIZER MEDICATION (SUCH AS ALBUTEROL): TWO OR THREE TIMES PER WEEK
QUESTION_1 LAST FOUR WEEKS HOW MUCH OF THE TIME DID YOUR ASTHMA KEEP YOU FROM GETTING AS MUCH DONE AT WORK, SCHOOL OR AT HOME: NONE OF THE TIME
QUESTION_3 LAST FOUR WEEKS HOW OFTEN DID YOUR ASTHMA SYMPTOMS (WHEEZING, COUGHING, SHORTNESS OF BREATH, CHEST TIGHTNESS OR PAIN) WAKE YOU UP AT NIGHT OR EARLIER THAN USUAL IN THE MORNING: NOT AT ALL
ACT_TOTALSCORE: 21

## 2022-09-08 ASSESSMENT — PATIENT HEALTH QUESTIONNAIRE - PHQ9
10. IF YOU CHECKED OFF ANY PROBLEMS, HOW DIFFICULT HAVE THESE PROBLEMS MADE IT FOR YOU TO DO YOUR WORK, TAKE CARE OF THINGS AT HOME, OR GET ALONG WITH OTHER PEOPLE: NOT DIFFICULT AT ALL
SUM OF ALL RESPONSES TO PHQ QUESTIONS 1-9: 1
SUM OF ALL RESPONSES TO PHQ QUESTIONS 1-9: 1

## 2022-09-08 ASSESSMENT — PAIN SCALES - GENERAL: PAINLEVEL: NO PAIN (0)

## 2022-09-08 NOTE — PROGRESS NOTES
"  Assessment & Plan     Intermittent asthma, uncomplicated  Asthma is well controlled   Take albuterol as needed , steroid inhaler temporary if she has cold   Refill provided on proair   - albuterol (PROAIR HFA/PROVENTIL HFA/VENTOLIN HFA) 108 (90 Base) MCG/ACT inhaler; Inhale 2 puffs into the lungs every 6 hours as needed for shortness of breath / dyspnea or wheezing    Nicotine dependence, uncomplicated, unspecified nicotine product type  SUBJECTIVE:  Francesca Moura is a 42 year old female who presents to the clinic today to discuss options to help her quit smoking. She reports that she smokes about  7-8 cig per day recently. She has tried to quit in the past during her recent pregnancy  The patient reports that her main reason to quit is a desire for better health    PLAN:  After we discussed the options and their individual efficacy and risks the patient felt that Zyban and Nicotine gum was the best choice for her.  We also discussed:    - [place] Quit Partner Referral; Future  - buPROPion (ZYBAN) 150 MG 12 hr tablet; Take 1 tablet (150 mg) by mouth every morning for 3 days, THEN 1 tablet (150 mg) 2 times daily for 27 days. Take 2nd dose no later than 4pm  - buPROPion (ZYBAN) 150 MG 12 hr tablet; Take 1 tablet (150 mg) by mouth 2 times daily After your quit date treatment generally continues 7-12 weeks. Take your afternoon dose no later than 4pm  - nicotine polacrilex (NICORETTE) 4 MG gum; How to take it: When the urge to smoke occurs, chew gum until you feel a tingle, then \"park\" the gum in your cheek until the tingle is gone. Re-chew every few minutes and \"park\" again, chewing one piece for 30 minutes. Do not eat or drink while chewing. Follow this schedule: Weeks 1 to 6: One piece of gum every 1 to 2 hours. Use at least 9 pieces a day, but no more than 24. Weeks 7 to 9: One piece of gum every 2 to 4 hours. Weeks 10 to 12: One piece of gum every 4 to 8 hours.    Seborrheic keratoses  Typical stuck on lesion on the " "right upper back   Referral to dermatology for removal  and skin cancer screen   Skin exam, screening for cancer    - Adult Dermatology Referral; Future             BMI:   Estimated body mass index is 38.27 kg/m  as calculated from the following:    Height as of this encounter: 1.651 m (5' 5\").    Weight as of this encounter: 104.3 kg (230 lb).           No follow-ups on file.    Cathy Menendez MD  [facility] NICHOLAS Moura is a 42 year old accompanied by her self, presenting for the following health issues:  Asthma      History of Present Illness       Reason for visit:  Asthma control, smoking cessation, mole on back  Symptom onset:  More than a month  Symptom intensity:  Mild  Symptom progression:  Staying the same  Had these symptoms before:  Yes  Has tried/received treatment for these symptoms:  Yes  Previous treatment was successful:  Yes  Prior treatment description:  Albuterol    She eats 4 or more servings of fruits and vegetables daily.She consumes 0 sweetened beverage(s) daily.She exercises with enough effort to increase her heart rate 30 to 60 minutes per day.  She exercises with enough effort to increase her heart rate 5 days per week.   She is taking medications regularly.    Today's PHQ-9         PHQ-9 Total Score: 1    PHQ-9 Q9 Thoughts of better off dead/self-harm past 2 weeks :   Not at all    How difficult have these problems made it for you to do your work, take care of things at home, or get along with other people: Not difficult at all         Review of Systems   Constitutional, HEENT, cardiovascular, pulmonary, gi and gu systems are negative, except as otherwise noted.      Objective    /85   Pulse 100   Temp 97.7  F (36.5  C) (Tympanic)   Resp 18   Ht 1.651 m (5' 5\")   Wt 104.3 kg (230 lb)   LMP 09/01/2022   SpO2 96%   BMI 38.27 kg/m    Body mass index is 38.27 kg/m .  Physical Exam  Vitals and nursing note reviewed.   Constitutional:       General: " She is not in acute distress.     Appearance: Normal appearance. She is not ill-appearing, toxic-appearing or diaphoretic.   HENT:      Head: Normocephalic and atraumatic.   Cardiovascular:      Pulses: Normal pulses.      Heart sounds: Normal heart sounds. No murmur heard.    No friction rub. No gallop.   Pulmonary:      Effort: Pulmonary effort is normal. No respiratory distress.      Breath sounds: Normal breath sounds. No stridor. No wheezing or rhonchi.   Musculoskeletal:        Arms:    Skin:     Capillary Refill: Capillary refill takes less than 2 seconds.   Neurological:      Mental Status: She is alert. Mental status is at baseline.

## 2022-11-20 ENCOUNTER — HEALTH MAINTENANCE LETTER (OUTPATIENT)
Age: 42
End: 2022-11-20

## 2022-11-30 ENCOUNTER — APPOINTMENT (OUTPATIENT)
Dept: URBAN - METROPOLITAN AREA CLINIC 252 | Age: 42
Setting detail: DERMATOLOGY
End: 2022-12-05

## 2022-11-30 VITALS — WEIGHT: 225 LBS | HEIGHT: 65 IN

## 2022-11-30 DIAGNOSIS — L82.1 OTHER SEBORRHEIC KERATOSIS: ICD-10-CM

## 2022-11-30 DIAGNOSIS — D49.2 NEOPLASM OF UNSPECIFIED BEHAVIOR OF BONE, SOFT TISSUE, AND SKIN: ICD-10-CM

## 2022-11-30 DIAGNOSIS — L82.0 INFLAMED SEBORRHEIC KERATOSIS: ICD-10-CM

## 2022-11-30 PROCEDURE — 99202 OFFICE O/P NEW SF 15 MIN: CPT | Mod: 25

## 2022-11-30 PROCEDURE — OTHER BIOPSY BY SHAVE METHOD: OTHER

## 2022-11-30 PROCEDURE — 17110 DESTRUCT B9 LESION 1-14: CPT

## 2022-11-30 PROCEDURE — OTHER COUNSELING: OTHER

## 2022-11-30 PROCEDURE — 11102 TANGNTL BX SKIN SINGLE LES: CPT | Mod: 59

## 2022-11-30 PROCEDURE — OTHER BENIGN DESTRUCTION: OTHER

## 2022-11-30 ASSESSMENT — LOCATION DETAILED DESCRIPTION DERM
LOCATION DETAILED: UPPER STERNUM
LOCATION DETAILED: RIGHT LATERAL SUPERIOR CHEST
LOCATION DETAILED: RIGHT INFERIOR UPPER BACK

## 2022-11-30 ASSESSMENT — LOCATION ZONE DERM: LOCATION ZONE: TRUNK

## 2022-11-30 ASSESSMENT — LOCATION SIMPLE DESCRIPTION DERM
LOCATION SIMPLE: RIGHT UPPER BACK
LOCATION SIMPLE: CHEST

## 2022-11-30 NOTE — PROCEDURE: BIOPSY BY SHAVE METHOD
Cryotherapy Text: The wound bed was treated with cryotherapy after the biopsy was performed.
Destruction After The Procedure: No
Wound Care: Petrolatum
Detail Level: Detailed
Hide Second Anesthesia?: Yes
Depth Of Biopsy: dermis
Billing Type: Third-Party Bill
Information: Selecting Yes will display possible errors in your note based on the variables you have selected. This validation is only offered as a suggestion for you. PLEASE NOTE THAT THE VALIDATION TEXT WILL BE REMOVED WHEN YOU FINALIZE YOUR NOTE. IF YOU WANT TO FAX A PRELIMINARY NOTE YOU WILL NEED TO TOGGLE THIS TO 'NO' IF YOU DO NOT WANT IT IN YOUR FAXED NOTE.
Biopsy Method: Dermablade
X Size Of Lesion In Cm: 0
Silver Nitrate Text: The wound bed was treated with silver nitrate after the biopsy was performed.
Anesthesia Volume In Cc (Will Not Render If 0): 0.5
Anesthesia Type: 2% lidocaine with epinephrine
Electrodesiccation Text: The wound bed was treated with electrodesiccation after the biopsy was performed.
Hemostasis: Aluminum Chloride
Notification Instructions: - It can take up to 2 weeks to get a biopsy result. \\n- Please refrain from calling to request results until after 2 weeks.
Curettage Text: The wound bed was treated with curettage after the biopsy was performed.
Consent: - Verbal and written consent was obtained and risks were reviewed prior to procedure today. \\n- Risks discussed include but are not limited to scarring, infection, bleeding, scabbing, incomplete removal, nerve damage, pain, and allergy to anesthesia.
Type Of Destruction Used: Electrodesiccation
Post-Care Instructions: WOUND CARE:\\nDo NOT submerge wound in a bath, swimming pool, or hot tub for at least 1 week or for as long as there is an open wound. Gently cleanse the site daily with mild soap and water. Be careful NOT to allow a forceful stream of water to hit the biopsy site. After cleaning/showering, apply a thin layer of petrolatum ointment or Aquaphor in the wound followed by an adhesive bandage. Continue this process daily until healed. \\n\\nBLEEDING:\\nIf you develop persistent bleeding, apply firm and steady pressure over the dressing with gauze for 15 minutes. If bleeding persists, reapply pressure with an ice pack over the gauze for 15 minutes. NEVER APPLY ICE DIRECTLY TO THE SKIN. Do NOT peek under the gauze during these 15 minutes of pressure.  Call our office at [phone] if you cannot get the bleeding to stop. \\n\\nINFECTION:\\nSigns of infection may include increasing rather than decreasing pain (after the first few days), increasing redness/swelling/heat, draining pus, pink/red streaks around the wound, and fever or chills.  Please call our office immediately at [phone] if infection is suspected. It is normal to have some mild redness on or around the wound edges; this will lighten day by day and will become less tender.\\n\\nPAIN:\\nPain is usually minimal, but if needed you may take acetaminophen (Tylenol) every four hours as needed. Applying an ice pack over the dressing for 15-20 minutes every 2-3 hours will relieve swelling, lessen pain, and help minimize bruising. NEVER APPLY ICE DIRECTLY TO THE SKIN. Avoid ibuprofen (Advil, Motrin) and naproxen (Aleve) for the first 48 hours as these can increase bleeding.\\n\\nSWELLIG AND BRUISING:\\nSwelling and bruising are common and temporary, usually lasting 1 - 2 weeks. It is more common in areas treated around the eyes, hands, and feet. In the days following the procedure, swelling and bruising can be minimized by keeping the affected areas elevated when possible, reducing salty foods, and applying ice packs over the dressing for 15-20 minutes every 2-3 hours. NEVER APPLY ICE DIRECTLY TO THE SKIN.\\n\\nITCHING:\\nItchiness on and around the wound is very common and can last several days to weeks after surgery. Mild itch is normal as the wound is healing. \\n\\nNERVE CHANGES:\\nNumbness is usually temporary, but it may last for several weeks to months. You may also experience sharp pains at the wound sight as it heals.  Mild pain is normal and will gradually improve with time.\\n \\nNO SMOKING:\\nSmoking will delay the healing process. If you smoke, we recommend trying to quit or at minimum reduce how much you smoke following a procedure.
Electrodesiccation And Curettage Text: The wound bed was treated with electrodesiccation and curettage after the biopsy was performed.
Dressing: bandage
Biopsy Type: H and E

## 2023-04-15 ENCOUNTER — HEALTH MAINTENANCE LETTER (OUTPATIENT)
Age: 43
End: 2023-04-15

## 2023-05-24 ENCOUNTER — APPOINTMENT (OUTPATIENT)
Dept: URBAN - METROPOLITAN AREA CLINIC 252 | Age: 43
Setting detail: DERMATOLOGY
End: 2023-05-29

## 2023-05-24 VITALS — HEIGHT: 65 IN | WEIGHT: 230 LBS | RESPIRATION RATE: 18 BRPM

## 2023-05-24 DIAGNOSIS — L71.0 PERIORAL DERMATITIS: ICD-10-CM

## 2023-05-24 PROCEDURE — OTHER COUNSELING: OTHER

## 2023-05-24 PROCEDURE — 99213 OFFICE O/P EST LOW 20 MIN: CPT

## 2023-05-24 PROCEDURE — OTHER PRESCRIPTION: OTHER

## 2023-05-24 RX ORDER — METRONIDAZOLE 7.5 MG/G
0.75% GEL TOPICAL BID
Qty: 45 | Refills: 1 | Status: ERX | COMMUNITY
Start: 2023-05-24

## 2023-05-24 RX ORDER — DOXYCYCLINE 100 MG/1
100MG CAPSULE ORAL TWICE A DAY
Qty: 60 | Refills: 1 | Status: ERX | COMMUNITY
Start: 2023-05-24

## 2023-05-24 ASSESSMENT — LOCATION ZONE DERM: LOCATION ZONE: FACE

## 2023-05-24 ASSESSMENT — LOCATION DETAILED DESCRIPTION DERM
LOCATION DETAILED: LEFT INFERIOR MEDIAL MALAR CHEEK
LOCATION DETAILED: RIGHT CENTRAL BUCCAL CHEEK
LOCATION DETAILED: RIGHT INFERIOR MEDIAL MALAR CHEEK

## 2023-05-24 ASSESSMENT — LOCATION SIMPLE DESCRIPTION DERM
LOCATION SIMPLE: LEFT CHEEK
LOCATION SIMPLE: RIGHT CHEEK

## 2023-05-24 NOTE — PROCEDURE: COUNSELING
Detail Level: Zone
Patient Specific Counseling (Will Not Stick From Patient To Patient): **pt instructed to incorporate probiotic while taking doxycycline \\n\\nPatient noted to have a lot of allergies to oral ABX, inquired if she has an Epi pen, states that she does not but states that Benadryl is effective for her and when she's gotten reactions, they've always been  hives.

## 2023-09-12 ENCOUNTER — TRANSFERRED RECORDS (OUTPATIENT)
Dept: HEALTH INFORMATION MANAGEMENT | Facility: CLINIC | Age: 43
End: 2023-09-12
Payer: COMMERCIAL

## 2023-11-08 ENCOUNTER — E-VISIT (OUTPATIENT)
Dept: URGENT CARE | Facility: CLINIC | Age: 43
End: 2023-11-08
Payer: COMMERCIAL

## 2023-11-08 DIAGNOSIS — N89.8 VAGINAL DISCHARGE: Primary | ICD-10-CM

## 2023-11-08 PROCEDURE — 99421 OL DIG E/M SVC 5-10 MIN: CPT | Performed by: PREVENTIVE MEDICINE

## 2023-11-08 NOTE — PATIENT INSTRUCTIONS
Thank you for choosing us for your care. Given your symptoms, I would like you to do a lab-only visit to determine what is causing them.  I have placed the orders.  Please schedule an appointment with the lab right here in [facility], or call [phone].  I will let you know when the results are back and next steps to take.  Vaginitis: Care Instructions  Overview     Vaginitis is soreness or infection of the vagina. This common problem can cause itching and burning. And it can cause a change in vaginal discharge. Sometimes it can cause pain during sex. Vaginitis may be caused by bacteria, yeast, or other germs. Some infections that cause it are caught from a sexual partner. Bath products, spermicides, and douches can irritate the vagina too.  This problem can also happen during and after menopause. A drop in estrogen levels during this time can cause dryness, soreness, and pain during sex.  Your doctor can give you medicine to treat vaginitis. And home care may help you feel better. For certain types of infections, your sex partner(s) must be treated too.  Follow-up care is a key part of your treatment and safety. Be sure to make and go to all appointments, and call your doctor if you are having problems. It's also a good idea to know your test results and keep a list of the medicines you take.  How can you care for yourself at home?  Take your medicines exactly as prescribed. Call your doctor if you think you are having a problem with your medicine.  Ask your doctor if your sex partner(s) also needs treatment.  Do not eat or drink anything that has alcohol if you are taking metronidazole (Flagyl).  Wash your vulva daily with water. You also can use a mild, unscented soap if you want.  Do not use scented bath products. And do not use vaginal sprays or douches.  Change out of wet or damp clothes as soon as possible. Wear cotton underwear. And avoid tight clothing that could increase moisture.  Put a washcloth soaked  "in cool water on the area to relieve itching. Or you can take cool baths.  If you have dryness because of menopause, use estrogen cream or pills that your doctor prescribes.  Ask your doctor about when it is okay to have sex.  Use a personal lubricant before sex if you have dryness. Examples are Astroglide, K-Y Jelly, and Wet Lubricant Gel.  When should you call for help?   Call your doctor now or seek immediate medical care if:    You have a fever.     You have new or increased pain in your vagina or pelvis.     You have new or worse vaginal itching or discharge.   Watch closely for changes in your health, and be sure to contact your doctor if:    You have bleeding other than your period.     You do not get better as expected.   Where can you learn more?  Go to https://www.[employer].net/patiented  Enter F219 in the search box to learn more about \"Vaginitis: Care Instructions.\"  Current as of: April 19, 2023               Content Version: 13.8    9724-4777 [employer].   Care instructions adapted under license by your healthcare professional. If you have questions about a medical condition or this instruction, always ask your healthcare professional. [employer] disclaims any warranty or liability for your use of this information.      "

## 2023-12-28 ENCOUNTER — MYC REFILL (OUTPATIENT)
Dept: FAMILY MEDICINE | Facility: CLINIC | Age: 43
End: 2023-12-28
Payer: COMMERCIAL

## 2023-12-28 DIAGNOSIS — J45.20 INTERMITTENT ASTHMA, UNCOMPLICATED: ICD-10-CM

## 2023-12-28 RX ORDER — ALBUTEROL SULFATE 90 UG/1
2 AEROSOL, METERED RESPIRATORY (INHALATION) EVERY 6 HOURS PRN
Qty: 18 G | Refills: 0 | Status: SHIPPED | OUTPATIENT
Start: 2023-12-28

## 2023-12-29 ENCOUNTER — TELEPHONE (OUTPATIENT)
Dept: FAMILY MEDICINE | Facility: CLINIC | Age: 43
End: 2023-12-29
Payer: COMMERCIAL

## 2023-12-29 NOTE — TELEPHONE ENCOUNTER
Prior Authorization Retail Medication Request    Medication/Dose: albuterol (PROAIR HFA/PROVENTIL HFA/VENTOLIN HFA) 108 (90 Base) MCG/ACT inhaler  Diagnosis and ICD code (if different than what is on RX):  Intermittent asthma, uncomplicated        New/renewal/insurance change PA/secondary ins. PA:  Previously Tried and Failed:    Rationale:      Insurance   Primary: [facility]  Insurance ID:  [id number]    Secondary (if applicable):  Insurance ID:      Pharmacy Information (if different than what is on RX)  Name:  [facility]  Phone:  [phone]  CMM Key: [id number]

## 2024-01-03 NOTE — TELEPHONE ENCOUNTER
Central Prior Authorization Team   Phone: [phone]    PA Initiation    Medication: ALBUTEROL SULFATE  (90 BASE) MCG/ACT IN AERS  Insurance Company: [employer] ([facility]) - Phone [phone] Fax [phone]  Pharmacy Filling the Rx: [id number] - [place], [place] - [address] AT [facility] [address]  Filling Pharmacy Phone: [phone]  Filling Pharmacy Fax:    Start Date: 1/3/2024    Called pharmacy, they were able to find a covered medication and the patient has already picked up.

## 2024-02-03 ENCOUNTER — HEALTH MAINTENANCE LETTER (OUTPATIENT)
Age: 44
End: 2024-02-03

## 2024-06-22 ENCOUNTER — HEALTH MAINTENANCE LETTER (OUTPATIENT)
Age: 44
End: 2024-06-22

## 2025-07-12 ENCOUNTER — HEALTH MAINTENANCE LETTER (OUTPATIENT)
Age: 45
End: 2025-07-12